# Patient Record
Sex: FEMALE | Race: WHITE | NOT HISPANIC OR LATINO | URBAN - METROPOLITAN AREA
[De-identification: names, ages, dates, MRNs, and addresses within clinical notes are randomized per-mention and may not be internally consistent; named-entity substitution may affect disease eponyms.]

---

## 2017-03-23 ENCOUNTER — EMERGENCY (EMERGENCY)
Facility: HOSPITAL | Age: 82
LOS: 0 days | Discharge: NURSING, REHAB OR LONG-TERM CARE | End: 2017-03-24
Attending: EMERGENCY MEDICINE | Admitting: EMERGENCY MEDICINE
Payer: COMMERCIAL

## 2017-03-23 VITALS
RESPIRATION RATE: 16 BRPM | TEMPERATURE: 97 F | DIASTOLIC BLOOD PRESSURE: 51 MMHG | HEIGHT: 61 IN | OXYGEN SATURATION: 94 % | SYSTOLIC BLOOD PRESSURE: 130 MMHG | HEART RATE: 54 BPM | WEIGHT: 130.07 LBS

## 2017-03-23 DIAGNOSIS — F03.90 UNSPECIFIED DEMENTIA, UNSPECIFIED SEVERITY, WITHOUT BEHAVIORAL DISTURBANCE, PSYCHOTIC DISTURBANCE, MOOD DISTURBANCE, AND ANXIETY: ICD-10-CM

## 2017-03-23 DIAGNOSIS — Z91.81 HISTORY OF FALLING: ICD-10-CM

## 2017-03-23 DIAGNOSIS — S09.90XA UNSPECIFIED INJURY OF HEAD, INITIAL ENCOUNTER: ICD-10-CM
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
## 2017-03-23 DIAGNOSIS — R55 SYNCOPE AND COLLAPSE: ICD-10-CM

## 2017-03-23 DIAGNOSIS — N39.0 URINARY TRACT INFECTION, SITE NOT SPECIFIED: ICD-10-CM
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
## 2017-03-23 PROCEDURE — 99285 EMERGENCY DEPT VISIT HI MDM: CPT | Mod: 25

## 2017-03-24 VITALS
OXYGEN SATURATION: 98 % | SYSTOLIC BLOOD PRESSURE: 137 MMHG | HEART RATE: 64 BPM | TEMPERATURE: 98 F | RESPIRATION RATE: 16 BRPM | DIASTOLIC BLOOD PRESSURE: 55 MMHG

## 2017-03-24 LAB
ALBUMIN SERPL ELPH-MCNC: 3.2 G/DL — LOW (ref 3.3–5)
ALP SERPL-CCNC: 78 U/L — SIGNIFICANT CHANGE UP (ref 40–120)
ALT FLD-CCNC: 14 U/L — SIGNIFICANT CHANGE UP (ref 12–78)
ANION GAP SERPL CALC-SCNC: 8 MMOL/L — SIGNIFICANT CHANGE UP (ref 5–17)
APPEARANCE UR: CLEAR — SIGNIFICANT CHANGE UP
APTT BLD: 29.7 SEC — SIGNIFICANT CHANGE UP (ref 27.5–37.4)
AST SERPL-CCNC: 24 U/L — SIGNIFICANT CHANGE UP (ref 15–37)
BACTERIA # UR AUTO: NEGATIVE — SIGNIFICANT CHANGE UP
BASOPHILS # BLD AUTO: 0.1 K/UL — SIGNIFICANT CHANGE UP (ref 0–0.2)
BASOPHILS NFR BLD AUTO: 1.2 % — SIGNIFICANT CHANGE UP (ref 0–2)
BILIRUB SERPL-MCNC: 0.3 MG/DL — SIGNIFICANT CHANGE UP (ref 0.2–1.2)
BILIRUB UR-MCNC: NEGATIVE — SIGNIFICANT CHANGE UP
BUN SERPL-MCNC: 19 MG/DL — SIGNIFICANT CHANGE UP (ref 7–23)
CALCIUM SERPL-MCNC: 8.9 MG/DL — SIGNIFICANT CHANGE UP (ref 8.5–10.1)
CHLORIDE SERPL-SCNC: 104 MMOL/L — SIGNIFICANT CHANGE UP (ref 96–108)
CK SERPL-CCNC: 96 U/L — SIGNIFICANT CHANGE UP (ref 26–192)
CO2 SERPL-SCNC: 30 MMOL/L — SIGNIFICANT CHANGE UP (ref 22–31)
COLOR SPEC: YELLOW — SIGNIFICANT CHANGE UP
CREAT SERPL-MCNC: 0.61 MG/DL — SIGNIFICANT CHANGE UP (ref 0.5–1.3)
DIFF PNL FLD: (no result)
EOSINOPHIL # BLD AUTO: 0 K/UL — SIGNIFICANT CHANGE UP (ref 0–0.5)
EOSINOPHIL NFR BLD AUTO: 0 % — SIGNIFICANT CHANGE UP (ref 0–6)
EPI CELLS # UR: NEGATIVE — SIGNIFICANT CHANGE UP
GLUCOSE SERPL-MCNC: 98 MG/DL — SIGNIFICANT CHANGE UP (ref 70–99)
GLUCOSE UR QL: NEGATIVE MG/DL — SIGNIFICANT CHANGE UP
HCT VFR BLD CALC: 37.4 % — SIGNIFICANT CHANGE UP (ref 34.5–45)
HGB BLD-MCNC: 12 G/DL — SIGNIFICANT CHANGE UP (ref 11.5–15.5)
INR BLD: 0.87 RATIO — LOW (ref 0.88–1.16)
KETONES UR-MCNC: (no result)
LACTATE SERPL-SCNC: 0.9 MMOL/L — SIGNIFICANT CHANGE UP (ref 0.7–2)
LEUKOCYTE ESTERASE UR-ACNC: (no result)
LYMPHOCYTES # BLD AUTO: 1.6 K/UL — SIGNIFICANT CHANGE UP (ref 1–3.3)
LYMPHOCYTES # BLD AUTO: 20.5 % — SIGNIFICANT CHANGE UP (ref 13–44)
MCHC RBC-ENTMCNC: 29.5 PG — SIGNIFICANT CHANGE UP (ref 27–34)
MCHC RBC-ENTMCNC: 32 GM/DL — SIGNIFICANT CHANGE UP (ref 32–36)
MCV RBC AUTO: 92.3 FL — SIGNIFICANT CHANGE UP (ref 80–100)
MONOCYTES # BLD AUTO: 0.6 K/UL — SIGNIFICANT CHANGE UP (ref 0–0.9)
MONOCYTES NFR BLD AUTO: 7.4 % — SIGNIFICANT CHANGE UP (ref 2–14)
NEUTROPHILS # BLD AUTO: 5.4 K/UL — SIGNIFICANT CHANGE UP (ref 1.8–7.4)
NEUTROPHILS NFR BLD AUTO: 70.9 % — SIGNIFICANT CHANGE UP (ref 43–77)
NITRITE UR-MCNC: NEGATIVE — SIGNIFICANT CHANGE UP
PH UR: 6 — SIGNIFICANT CHANGE UP (ref 4.8–8)
PLATELET # BLD AUTO: 237 K/UL — SIGNIFICANT CHANGE UP (ref 150–400)
POTASSIUM SERPL-MCNC: 4.6 MMOL/L — SIGNIFICANT CHANGE UP (ref 3.5–5.3)
POTASSIUM SERPL-SCNC: 4.6 MMOL/L — SIGNIFICANT CHANGE UP (ref 3.5–5.3)
PROT SERPL-MCNC: 6.4 GM/DL — SIGNIFICANT CHANGE UP (ref 6–8.3)
PROT UR-MCNC: NEGATIVE MG/DL — SIGNIFICANT CHANGE UP
PROTHROM AB SERPL-ACNC: 9.4 SEC — LOW (ref 9.8–12.7)
RBC # BLD: 4.05 M/UL — SIGNIFICANT CHANGE UP (ref 3.8–5.2)
RBC # FLD: 14 % — SIGNIFICANT CHANGE UP (ref 10.3–14.5)
RBC CASTS # UR COMP ASSIST: SIGNIFICANT CHANGE UP /HPF (ref 0–4)
SODIUM SERPL-SCNC: 142 MMOL/L — SIGNIFICANT CHANGE UP (ref 135–145)
SP GR SPEC: 1.01 — SIGNIFICANT CHANGE UP (ref 1.01–1.02)
TROPONIN I SERPL-MCNC: 0.08 NG/ML — HIGH (ref 0.01–0.04)
TROPONIN I SERPL-MCNC: 0.08 NG/ML — HIGH (ref 0.01–0.04)
UROBILINOGEN FLD QL: NEGATIVE MG/DL — SIGNIFICANT CHANGE UP
WBC # BLD: 7.7 K/UL — SIGNIFICANT CHANGE UP (ref 3.8–10.5)
WBC # FLD AUTO: 7.7 K/UL — SIGNIFICANT CHANGE UP (ref 3.8–10.5)
WBC UR QL: SIGNIFICANT CHANGE UP

## 2017-03-24 PROCEDURE — 72125 CT NECK SPINE W/O DYE: CPT | Mod: 26

## 2017-03-24 PROCEDURE — 70450 CT HEAD/BRAIN W/O DYE: CPT | Mod: 26

## 2017-03-24 PROCEDURE — 93010 ELECTROCARDIOGRAM REPORT: CPT

## 2017-03-24 PROCEDURE — 72190 X-RAY EXAM OF PELVIS: CPT | Mod: 26

## 2017-03-24 PROCEDURE — 71010: CPT | Mod: 26

## 2017-03-24 RX ORDER — SODIUM CHLORIDE 9 MG/ML
500 INJECTION INTRAMUSCULAR; INTRAVENOUS; SUBCUTANEOUS ONCE
Qty: 0 | Refills: 0 | Status: COMPLETED | OUTPATIENT
Start: 2017-03-24 | End: 2017-03-24

## 2017-03-24 RX ORDER — CEPHALEXIN 500 MG
1 CAPSULE ORAL
Qty: 21 | Refills: 0 | OUTPATIENT
Start: 2017-03-24

## 2017-03-24 RX ADMIN — SODIUM CHLORIDE 500 MILLILITER(S): 9 INJECTION INTRAMUSCULAR; INTRAVENOUS; SUBCUTANEOUS at 00:35

## 2017-03-24 NOTE — ED PROVIDER NOTE - ENMT, MLM
Airway patent, Nasal mucosa clear. Mouth with normal mucosa. Throat has no vesicles, no oropharyngeal exudates and uvula is midline. HEAD NCAT

## 2017-03-24 NOTE — ED PROVIDER NOTE - CARE PLAN
Principal Discharge DX:	Fall, initial encounter  Secondary Diagnosis:	Syncope, unspecified syncope type Principal Discharge DX:	Fall, initial encounter  Secondary Diagnosis:	Syncope, unspecified syncope type  Secondary Diagnosis:	Urinary tract infection with hematuria, site unspecified

## 2017-03-24 NOTE — ED PROVIDER NOTE - PROGRESS NOTE DETAILS
pt stable all night, no symptoms, essentially no change in trops throughout the night and stable for DC back to nursing home. pt stable all night, no symptoms, essentially no change in trops throughout the night and stable for DC back to nursing home with treatment for UTI and advise return for change.  Cts and xrays neg, ekg and labs neg as well and stable for DC home

## 2017-03-24 NOTE — ED ADULT NURSE REASSESSMENT NOTE - NS ED NURSE REASSESS COMMENT FT1
pt is resting comfortable ,  VSS. awaiting transportation to [facility]  on [address]. Will continue to monitor,.

## 2017-10-30 ENCOUNTER — EMERGENCY (EMERGENCY)
Facility: HOSPITAL | Age: 82
LOS: 0 days | Discharge: HOME | End: 2017-10-30
Attending: EMERGENCY MEDICINE | Admitting: EMERGENCY MEDICINE
Payer: COMMERCIAL

## 2017-10-30 VITALS
RESPIRATION RATE: 18 BRPM | HEART RATE: 56 BPM | TEMPERATURE: 98 F | DIASTOLIC BLOOD PRESSURE: 69 MMHG | SYSTOLIC BLOOD PRESSURE: 153 MMHG | OXYGEN SATURATION: 97 %

## 2017-10-30 VITALS
OXYGEN SATURATION: 96 % | TEMPERATURE: 98 F | HEART RATE: 53 BPM | DIASTOLIC BLOOD PRESSURE: 92 MMHG | RESPIRATION RATE: 16 BRPM | WEIGHT: 87.08 LBS | SYSTOLIC BLOOD PRESSURE: 132 MMHG | HEIGHT: 60 IN

## 2017-10-30 DIAGNOSIS — I10 ESSENTIAL (PRIMARY) HYPERTENSION: ICD-10-CM

## 2017-10-30 DIAGNOSIS — M50.30 OTHER CERVICAL DISC DEGENERATION, UNSPECIFIED CERVICAL REGION: ICD-10-CM

## 2017-10-30 DIAGNOSIS — S09.90XA UNSPECIFIED INJURY OF HEAD, INITIAL ENCOUNTER: ICD-10-CM

## 2017-10-30 DIAGNOSIS — F41.9 ANXIETY DISORDER, UNSPECIFIED: ICD-10-CM

## 2017-10-30 DIAGNOSIS — K21.9 GASTRO-ESOPHAGEAL REFLUX DISEASE WITHOUT ESOPHAGITIS: ICD-10-CM

## 2017-10-30 DIAGNOSIS — I67.89 OTHER CEREBROVASCULAR DISEASE: ICD-10-CM

## 2017-10-30 DIAGNOSIS — F03.90 UNSPECIFIED DEMENTIA WITHOUT BEHAVIORAL DISTURBANCE: ICD-10-CM

## 2017-10-30 DIAGNOSIS — Z79.82 LONG TERM (CURRENT) USE OF ASPIRIN: ICD-10-CM

## 2017-10-30 DIAGNOSIS — M47.9 SPONDYLOSIS, UNSPECIFIED: ICD-10-CM

## 2017-10-30 DIAGNOSIS — E78.5 HYPERLIPIDEMIA, UNSPECIFIED: ICD-10-CM
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
## 2017-10-30 LAB
ALBUMIN SERPL ELPH-MCNC: 3.3 G/DL — SIGNIFICANT CHANGE UP (ref 3.3–5)
ALP SERPL-CCNC: 119 U/L — SIGNIFICANT CHANGE UP (ref 40–120)
ALT FLD-CCNC: 16 U/L — SIGNIFICANT CHANGE UP (ref 12–78)
ANION GAP SERPL CALC-SCNC: 8 MMOL/L — SIGNIFICANT CHANGE UP (ref 5–17)
AST SERPL-CCNC: 21 U/L — SIGNIFICANT CHANGE UP (ref 15–37)
BASOPHILS # BLD AUTO: 0.1 K/UL — SIGNIFICANT CHANGE UP (ref 0–0.2)
BASOPHILS NFR BLD AUTO: 0.9 % — SIGNIFICANT CHANGE UP (ref 0–2)
BILIRUB SERPL-MCNC: 0.5 MG/DL — SIGNIFICANT CHANGE UP (ref 0.2–1.2)
BUN SERPL-MCNC: 18 MG/DL — SIGNIFICANT CHANGE UP (ref 7–23)
CALCIUM SERPL-MCNC: 8.7 MG/DL — SIGNIFICANT CHANGE UP (ref 8.5–10.1)
CHLORIDE SERPL-SCNC: 104 MMOL/L — SIGNIFICANT CHANGE UP (ref 96–108)
CO2 SERPL-SCNC: 30 MMOL/L — SIGNIFICANT CHANGE UP (ref 22–31)
CREAT SERPL-MCNC: 0.59 MG/DL — SIGNIFICANT CHANGE UP (ref 0.5–1.3)
EOSINOPHIL # BLD AUTO: 0 K/UL — SIGNIFICANT CHANGE UP (ref 0–0.5)
EOSINOPHIL NFR BLD AUTO: 0.1 % — SIGNIFICANT CHANGE UP (ref 0–6)
GLUCOSE SERPL-MCNC: 74 MG/DL — SIGNIFICANT CHANGE UP (ref 70–99)
HCT VFR BLD CALC: 42.2 % — SIGNIFICANT CHANGE UP (ref 34.5–45)
HGB BLD-MCNC: 13.8 G/DL — SIGNIFICANT CHANGE UP (ref 11.5–15.5)
LYMPHOCYTES # BLD AUTO: 2.2 K/UL — SIGNIFICANT CHANGE UP (ref 1–3.3)
LYMPHOCYTES # BLD AUTO: 31.1 % — SIGNIFICANT CHANGE UP (ref 13–44)
MCHC RBC-ENTMCNC: 29.7 PG — SIGNIFICANT CHANGE UP (ref 27–34)
MCHC RBC-ENTMCNC: 32.6 GM/DL — SIGNIFICANT CHANGE UP (ref 32–36)
MCV RBC AUTO: 91.1 FL — SIGNIFICANT CHANGE UP (ref 80–100)
MONOCYTES # BLD AUTO: 0.7 K/UL — SIGNIFICANT CHANGE UP (ref 0–0.9)
MONOCYTES NFR BLD AUTO: 9.9 % — SIGNIFICANT CHANGE UP (ref 2–14)
NEUTROPHILS # BLD AUTO: 4 K/UL — SIGNIFICANT CHANGE UP (ref 1.8–7.4)
NEUTROPHILS NFR BLD AUTO: 58 % — SIGNIFICANT CHANGE UP (ref 43–77)
PLATELET # BLD AUTO: 293 K/UL — SIGNIFICANT CHANGE UP (ref 150–400)
POTASSIUM SERPL-MCNC: 3.8 MMOL/L — SIGNIFICANT CHANGE UP (ref 3.5–5.3)
POTASSIUM SERPL-SCNC: 3.8 MMOL/L — SIGNIFICANT CHANGE UP (ref 3.5–5.3)
PROT SERPL-MCNC: 6.7 GM/DL — SIGNIFICANT CHANGE UP (ref 6–8.3)
RBC # BLD: 4.63 M/UL — SIGNIFICANT CHANGE UP (ref 3.8–5.2)
RBC # FLD: 12.6 % — SIGNIFICANT CHANGE UP (ref 10.3–14.5)
SODIUM SERPL-SCNC: 142 MMOL/L — SIGNIFICANT CHANGE UP (ref 135–145)
WBC # BLD: 6.9 K/UL — SIGNIFICANT CHANGE UP (ref 3.8–10.5)
WBC # FLD AUTO: 6.9 K/UL — SIGNIFICANT CHANGE UP (ref 3.8–10.5)

## 2017-10-30 PROCEDURE — 72125 CT NECK SPINE W/O DYE: CPT | Mod: 26

## 2017-10-30 PROCEDURE — 70450 CT HEAD/BRAIN W/O DYE: CPT | Mod: 26

## 2017-10-30 PROCEDURE — 99285 EMERGENCY DEPT VISIT HI MDM: CPT

## 2017-10-30 RX ORDER — LATANOPROST 0.05 MG/ML
1 SOLUTION/ DROPS OPHTHALMIC; TOPICAL
Qty: 0 | Refills: 0 | COMMUNITY

## 2017-10-30 NOTE — ED PROVIDER NOTE - OBJECTIVE STATEMENT
89F pmhx dementia, HLD, HTN, GERD, anxiety presents to the ED s/p fall. Pt states she doesn't remember anything, only that she fell. No acute complaint at this time. Pt is a poor historian and HPI/ROS is limited.

## 2017-10-30 NOTE — ED PROVIDER NOTE - MUSCULOSKELETAL, MLM
Spine appears normal, range of motion is not limited, no muscle or joint tenderness, no posterior c-spine ttp, no hip instability or pain, no chest wall ttp

## 2017-10-30 NOTE — ED ADULT NURSE NOTE - OBJECTIVE STATEMENT
Pt brought in by ambulance for unwitnessed fall at [facility]isted [facility]. Pt has abrasion with ecchymotic area on forehead. Pt brought in by ambulance for unwitnessed fall at [facility]isted [facility]. Pt has abrasion with ecchymotic area on forehead. Pt is on ASA in facility. Pt brought in by ambulance for unwitnessed fall at [facility]isted [facility]. Pt has abrasion with ecchymotic area on forehead. Pt is on ASA in facility. Pt talking clearly although very forgetful. No other visible injury noted. Pt is a trauma alert

## 2017-11-16 ENCOUNTER — EMERGENCY (EMERGENCY)
Facility: HOSPITAL | Age: 82
LOS: 0 days | Discharge: HOME | End: 2017-11-16
Attending: EMERGENCY MEDICINE | Admitting: EMERGENCY MEDICINE
Payer: COMMERCIAL

## 2017-11-16 VITALS
HEART RATE: 82 BPM | DIASTOLIC BLOOD PRESSURE: 92 MMHG | OXYGEN SATURATION: 94 % | RESPIRATION RATE: 18 BRPM | TEMPERATURE: 98 F | SYSTOLIC BLOOD PRESSURE: 129 MMHG

## 2017-11-16 VITALS — WEIGHT: 100.09 LBS | HEIGHT: 60 IN

## 2017-11-16 LAB
ALBUMIN SERPL ELPH-MCNC: 2.7 G/DL — LOW (ref 3.3–5)
ALP SERPL-CCNC: 100 U/L — SIGNIFICANT CHANGE UP (ref 40–120)
ALT FLD-CCNC: 17 U/L — SIGNIFICANT CHANGE UP (ref 12–78)
ANION GAP SERPL CALC-SCNC: 6 MMOL/L — SIGNIFICANT CHANGE UP (ref 5–17)
APTT BLD: 28.8 SEC — SIGNIFICANT CHANGE UP (ref 27.5–37.4)
AST SERPL-CCNC: 25 U/L — SIGNIFICANT CHANGE UP (ref 15–37)
BASOPHILS # BLD AUTO: 0.1 K/UL — SIGNIFICANT CHANGE UP (ref 0–0.2)
BASOPHILS NFR BLD AUTO: 0.9 % — SIGNIFICANT CHANGE UP (ref 0–2)
BILIRUB SERPL-MCNC: 0.5 MG/DL — SIGNIFICANT CHANGE UP (ref 0.2–1.2)
BUN SERPL-MCNC: 16 MG/DL — SIGNIFICANT CHANGE UP (ref 7–23)
CALCIUM SERPL-MCNC: 8.3 MG/DL — LOW (ref 8.5–10.1)
CHLORIDE SERPL-SCNC: 106 MMOL/L — SIGNIFICANT CHANGE UP (ref 96–108)
CO2 SERPL-SCNC: 29 MMOL/L — SIGNIFICANT CHANGE UP (ref 22–31)
CREAT SERPL-MCNC: 0.63 MG/DL — SIGNIFICANT CHANGE UP (ref 0.5–1.3)
EOSINOPHIL # BLD AUTO: 0 K/UL — SIGNIFICANT CHANGE UP (ref 0–0.5)
EOSINOPHIL NFR BLD AUTO: 0 % — SIGNIFICANT CHANGE UP (ref 0–6)
GLUCOSE SERPL-MCNC: 99 MG/DL — SIGNIFICANT CHANGE UP (ref 70–99)
HCT VFR BLD CALC: 39.4 % — SIGNIFICANT CHANGE UP (ref 34.5–45)
HGB BLD-MCNC: 12.4 G/DL — SIGNIFICANT CHANGE UP (ref 11.5–15.5)
INR BLD: 0.87 RATIO — LOW (ref 0.88–1.16)
LYMPHOCYTES # BLD AUTO: 1.5 K/UL — SIGNIFICANT CHANGE UP (ref 1–3.3)
LYMPHOCYTES # BLD AUTO: 13.7 % — SIGNIFICANT CHANGE UP (ref 13–44)
MCHC RBC-ENTMCNC: 29.4 PG — SIGNIFICANT CHANGE UP (ref 27–34)
MCHC RBC-ENTMCNC: 31.6 GM/DL — LOW (ref 32–36)
MCV RBC AUTO: 93.1 FL — SIGNIFICANT CHANGE UP (ref 80–100)
MONOCYTES # BLD AUTO: 0.9 K/UL — SIGNIFICANT CHANGE UP (ref 0–0.9)
MONOCYTES NFR BLD AUTO: 8.9 % — SIGNIFICANT CHANGE UP (ref 2–14)
NEUTROPHILS # BLD AUTO: 8.1 K/UL — HIGH (ref 1.8–7.4)
NEUTROPHILS NFR BLD AUTO: 76.4 % — SIGNIFICANT CHANGE UP (ref 43–77)
PLATELET # BLD AUTO: 227 K/UL — SIGNIFICANT CHANGE UP (ref 150–400)
POTASSIUM SERPL-MCNC: 4.4 MMOL/L — SIGNIFICANT CHANGE UP (ref 3.5–5.3)
POTASSIUM SERPL-SCNC: 4.4 MMOL/L — SIGNIFICANT CHANGE UP (ref 3.5–5.3)
PROT SERPL-MCNC: 5.7 GM/DL — LOW (ref 6–8.3)
PROTHROM AB SERPL-ACNC: 9.4 SEC — LOW (ref 9.8–12.7)
RBC # BLD: 4.23 M/UL — SIGNIFICANT CHANGE UP (ref 3.8–5.2)
RBC # FLD: 13.2 % — SIGNIFICANT CHANGE UP (ref 10.3–14.5)
SODIUM SERPL-SCNC: 141 MMOL/L — SIGNIFICANT CHANGE UP (ref 135–145)
WBC # BLD: 10.6 K/UL — HIGH (ref 3.8–10.5)
WBC # FLD AUTO: 10.6 K/UL — HIGH (ref 3.8–10.5)

## 2017-11-16 PROCEDURE — 99284 EMERGENCY DEPT VISIT MOD MDM: CPT | Mod: 25

## 2017-11-16 RX ORDER — MIDAZOLAM HYDROCHLORIDE 1 MG/ML
6 INJECTION, SOLUTION INTRAMUSCULAR; INTRAVENOUS ONCE
Qty: 0 | Refills: 0 | Status: DISCONTINUED | OUTPATIENT
Start: 2017-11-16 | End: 2017-11-16

## 2017-11-16 RX ADMIN — MIDAZOLAM HYDROCHLORIDE 6 MILLIGRAM(S): 1 INJECTION, SOLUTION INTRAMUSCULAR; INTRAVENOUS at 09:09

## 2017-11-16 RX ADMIN — MIDAZOLAM HYDROCHLORIDE 6 MILLIGRAM(S): 1 INJECTION, SOLUTION INTRAMUSCULAR; INTRAVENOUS at 11:10

## 2017-11-16 NOTE — ED ADULT NURSE REASSESSMENT NOTE - NS ED NURSE REASSESS COMMENT FT1
Colorectal to bedside and pt not tolerating reduction well. MD ordered versed intranasal with call out to colorectal to notify them that pt is prepared for medication.

## 2017-11-16 NOTE — ED ADULT TRIAGE NOTE - CHIEF COMPLAINT QUOTE
As per ems rectal prolapse, blood noted to rectum with prolapse upon ems arrival. Hx GERD and dementia. Complaining of rectal pain.

## 2017-11-16 NOTE — CONSULT NOTE ADULT - SUBJECTIVE AND OBJECTIVE BOX
89 y/o female with ED visit for rectal prolapse. Pt is a poor historian, ED staff states she has been in Er before for similar and they have reduced prolapse successfully.   not sure how long this prolapse has been present    PAST MEDICAL & SURGICAL HISTORY:  Dementia  GERD (gastroesophageal reflux disease)  HLD (hyperlipidemia)  HTN (hypertension)  No significant past surgical history    MEDICATIONS  (STANDING):    MEDICATIONS  (PRN):    Allergies    No Known Allergies    Intolerances    Vital Signs Last 24 Hrs  T(C): 36.6 (16 Nov 2017 06:23), Max: 36.6 (16 Nov 2017 06:23)  T(F): 97.8 (16 Nov 2017 06:23), Max: 97.8 (16 Nov 2017 06:23)  HR: 88 (16 Nov 2017 13:15) (77 - 99)  BP: 133/83 (16 Nov 2017 13:15) (105/84 - 171/68)  BP(mean): --  RR: 18 (16 Nov 2017 13:15) (18 - 18)  SpO2: 95% (16 Nov 2017 13:15) (94% - 98%)    awake, mildly combative  abd soft  full thickness rectal prolapse ~6-7 cm   sugar has been placed on prolapse in attempt to reduce  viable rectum    Dr. Singh and myself were able to reduce the prolapse  with minimal bleeding                          12.4   10.6  )-----------( 227      ( 16 Nov 2017 12:22 )             39.4   11-16    141  |  106  |  16  ----------------------------<  99  4.4   |  29  |  0.63    Ca    8.3<L>      16 Nov 2017 12:22    TPro  5.7<L>  /  Alb  2.7<L>  /  TBili  0.5  /  DBili  x   /  AST  25  /  ALT  17  /  AlkPhos  100  11-16    PT/INR - ( 16 Nov 2017 12:22 )   PT: 9.4 sec;   INR: 0.87 ratio         PTT - ( 16 Nov 2017 12:22 )  PTT:28.8 sec

## 2017-11-16 NOTE — ED ADULT NURSE REASSESSMENT NOTE - NS ED NURSE REASSESS COMMENT FT1
colorectal to bedside again and unable to reduce. Pt to be to OR and IVL placed as ordered. Will cont to monitor.

## 2017-11-16 NOTE — ED PROVIDER NOTE - OBJECTIVE STATEMENT
89 yo female with h/o HTN, GERD, Dementia and known h/o rectal prolapse, sent to ED with rectal prolapse.  Pt offers no complaints.

## 2017-11-16 NOTE — ED ADULT NURSE NOTE - ED STAT RN HANDOFF DETAILS
Pt received from prior RN alert and confused with report that she is at her baseline status. Pt has rectal prolapse unable to be reduced bedside. Pt awaiting further consult at this time. Will cont to monitor.

## 2017-11-16 NOTE — CONSULT NOTE ADULT - ASSESSMENT
rectal prolapse recurrent    stable for dc   may f/u in office to discuss further management   jayshree otto  ED staff notified

## 2017-11-16 NOTE — ED ADULT NURSE NOTE - OBJECTIVE STATEMENT
Pt presents to the ED with complaints of rectal pain s/p prolapsed rectum. Pt has a h/o a prolapsed rectum. No bleeding noted to the area.

## 2017-11-17 DIAGNOSIS — E78.5 HYPERLIPIDEMIA, UNSPECIFIED: ICD-10-CM

## 2017-11-17 DIAGNOSIS — Z79.82 LONG TERM (CURRENT) USE OF ASPIRIN: ICD-10-CM

## 2017-11-17 DIAGNOSIS — K21.9 GASTRO-ESOPHAGEAL REFLUX DISEASE WITHOUT ESOPHAGITIS: ICD-10-CM

## 2017-11-17 DIAGNOSIS — I10 ESSENTIAL (PRIMARY) HYPERTENSION: ICD-10-CM

## 2017-11-17 DIAGNOSIS — K62.3 RECTAL PROLAPSE: ICD-10-CM

## 2017-11-17 DIAGNOSIS — F03.90 UNSPECIFIED DEMENTIA WITHOUT BEHAVIORAL DISTURBANCE: ICD-10-CM

## 2017-11-17 DIAGNOSIS — K62.89 OTHER SPECIFIED DISEASES OF ANUS AND RECTUM: ICD-10-CM

## 2017-12-29 ENCOUNTER — INPATIENT (INPATIENT)
Facility: HOSPITAL | Age: 82
LOS: 10 days | Discharge: HOSPICE | End: 2018-01-09
Attending: INTERNAL MEDICINE | Admitting: FAMILY MEDICINE
Payer: COMMERCIAL

## 2017-12-29 VITALS — DIASTOLIC BLOOD PRESSURE: 73 MMHG | HEART RATE: 84 BPM | SYSTOLIC BLOOD PRESSURE: 122 MMHG | RESPIRATION RATE: 16 BRPM

## 2017-12-29 LAB
ANION GAP SERPL CALC-SCNC: 10 MMOL/L — SIGNIFICANT CHANGE UP (ref 5–17)
APPEARANCE UR: CLEAR — SIGNIFICANT CHANGE UP
BACTERIA # UR AUTO: (no result)
BASOPHILS # BLD AUTO: 0.1 K/UL — SIGNIFICANT CHANGE UP (ref 0–0.2)
BASOPHILS NFR BLD AUTO: 0.3 % — SIGNIFICANT CHANGE UP (ref 0–2)
BILIRUB UR-MCNC: NEGATIVE — SIGNIFICANT CHANGE UP
BUN SERPL-MCNC: 34 MG/DL — HIGH (ref 7–23)
BURR CELLS BLD QL SMEAR: SIGNIFICANT CHANGE UP
CALCIUM SERPL-MCNC: 8.5 MG/DL — SIGNIFICANT CHANGE UP (ref 8.5–10.1)
CHLORIDE SERPL-SCNC: 105 MMOL/L — SIGNIFICANT CHANGE UP (ref 96–108)
CO2 SERPL-SCNC: 25 MMOL/L — SIGNIFICANT CHANGE UP (ref 22–31)
COLOR SPEC: YELLOW — SIGNIFICANT CHANGE UP
COMMENT - URINE: SIGNIFICANT CHANGE UP
CREAT SERPL-MCNC: 1.18 MG/DL — SIGNIFICANT CHANGE UP (ref 0.5–1.3)
DIFF PNL FLD: NEGATIVE — SIGNIFICANT CHANGE UP
ELLIPTOCYTES BLD QL SMEAR: SLIGHT — SIGNIFICANT CHANGE UP
EOSINOPHIL # BLD AUTO: 0 K/UL — SIGNIFICANT CHANGE UP (ref 0–0.5)
EOSINOPHIL NFR BLD AUTO: 0 % — SIGNIFICANT CHANGE UP (ref 0–6)
EPI CELLS # UR: SIGNIFICANT CHANGE UP
GLUCOSE SERPL-MCNC: 157 MG/DL — HIGH (ref 70–99)
GLUCOSE UR QL: NEGATIVE MG/DL — SIGNIFICANT CHANGE UP
GRAN CASTS # UR COMP ASSIST: (no result) /LPF
HCT VFR BLD CALC: 29.8 % — LOW (ref 34.5–45)
HGB BLD-MCNC: 9.6 G/DL — LOW (ref 11.5–15.5)
KETONES UR-MCNC: NEGATIVE — SIGNIFICANT CHANGE UP
LEUKOCYTE ESTERASE UR-ACNC: NEGATIVE — SIGNIFICANT CHANGE UP
LYMPHOCYTES # BLD AUTO: 0.8 K/UL — LOW (ref 1–3.3)
LYMPHOCYTES # BLD AUTO: 4.4 % — LOW (ref 13–44)
MANUAL DIF COMMENT BLD-IMP: SIGNIFICANT CHANGE UP
MCHC RBC-ENTMCNC: 30.5 PG — SIGNIFICANT CHANGE UP (ref 27–34)
MCHC RBC-ENTMCNC: 32.4 GM/DL — SIGNIFICANT CHANGE UP (ref 32–36)
MCV RBC AUTO: 94.2 FL — SIGNIFICANT CHANGE UP (ref 80–100)
MONOCYTES # BLD AUTO: 1.2 K/UL — HIGH (ref 0–0.9)
MONOCYTES NFR BLD AUTO: 7.1 % — SIGNIFICANT CHANGE UP (ref 2–14)
NEUTROPHILS # BLD AUTO: 15.2 K/UL — HIGH (ref 1.8–7.4)
NEUTROPHILS NFR BLD AUTO: 88.1 % — HIGH (ref 43–77)
NEUTS BAND # BLD: 4 % — SIGNIFICANT CHANGE UP (ref 0–8)
NITRITE UR-MCNC: NEGATIVE — SIGNIFICANT CHANGE UP
PH UR: 6 — SIGNIFICANT CHANGE UP (ref 5–8)
PLAT MORPH BLD: NORMAL — SIGNIFICANT CHANGE UP
PLATELET # BLD AUTO: 288 K/UL — SIGNIFICANT CHANGE UP (ref 150–400)
POIKILOCYTOSIS BLD QL AUTO: SLIGHT — SIGNIFICANT CHANGE UP
POTASSIUM SERPL-MCNC: 4.9 MMOL/L — SIGNIFICANT CHANGE UP (ref 3.5–5.3)
POTASSIUM SERPL-SCNC: 4.9 MMOL/L — SIGNIFICANT CHANGE UP (ref 3.5–5.3)
PROT UR-MCNC: 15 MG/DL
RBC # BLD: 3.16 M/UL — LOW (ref 3.8–5.2)
RBC # FLD: 13.6 % — SIGNIFICANT CHANGE UP (ref 10.3–14.5)
RBC BLD AUTO: (no result)
RBC CASTS # UR COMP ASSIST: SIGNIFICANT CHANGE UP /HPF (ref 0–4)
SCHISTOCYTES BLD QL AUTO: SIGNIFICANT CHANGE UP
SODIUM SERPL-SCNC: 140 MMOL/L — SIGNIFICANT CHANGE UP (ref 135–145)
SP GR SPEC: 1.01 — SIGNIFICANT CHANGE UP (ref 1.01–1.02)
TOXIC GRANULES BLD QL SMEAR: SLIGHT — SIGNIFICANT CHANGE UP
UROBILINOGEN FLD QL: 1 MG/DL
WBC # BLD: 17.3 K/UL — HIGH (ref 3.8–10.5)
WBC # FLD AUTO: 17.3 K/UL — HIGH (ref 3.8–10.5)
WBC UR QL: SIGNIFICANT CHANGE UP

## 2017-12-29 PROCEDURE — 71010: CPT | Mod: 26

## 2017-12-29 PROCEDURE — 99285 EMERGENCY DEPT VISIT HI MDM: CPT

## 2017-12-29 RX ORDER — CEFEPIME 1 G/1
1000 INJECTION, POWDER, FOR SOLUTION INTRAMUSCULAR; INTRAVENOUS ONCE
Qty: 0 | Refills: 0 | Status: COMPLETED | OUTPATIENT
Start: 2017-12-29 | End: 2017-12-29

## 2017-12-29 RX ORDER — VANCOMYCIN HCL 1 G
1000 VIAL (EA) INTRAVENOUS ONCE
Qty: 0 | Refills: 0 | Status: COMPLETED | OUTPATIENT
Start: 2017-12-29 | End: 2017-12-30

## 2017-12-29 RX ORDER — SODIUM CHLORIDE 9 MG/ML
2000 INJECTION INTRAMUSCULAR; INTRAVENOUS; SUBCUTANEOUS ONCE
Qty: 0 | Refills: 0 | Status: COMPLETED | OUTPATIENT
Start: 2017-12-29 | End: 2017-12-29

## 2017-12-29 RX ADMIN — SODIUM CHLORIDE 2000 MILLILITER(S): 9 INJECTION INTRAMUSCULAR; INTRAVENOUS; SUBCUTANEOUS at 18:57

## 2017-12-29 NOTE — ED PROVIDER NOTE - OBJECTIVE STATEMENT
Pt comes ot the ED sent by [facility] for r/o dehydration. Pt has hx of dementia, HTN, HPL. Pt denies fevers. In Ed patient is thin and frail appearing complaining of no pain. Tachy to 110.

## 2017-12-30 DIAGNOSIS — N28.1 CYST OF KIDNEY, ACQUIRED: ICD-10-CM

## 2017-12-30 LAB
ADD ON TEST-SPECIMEN IN LAB: SIGNIFICANT CHANGE UP
ALBUMIN SERPL ELPH-MCNC: 3.1 G/DL — LOW (ref 3.3–5)
ALBUMIN SERPL ELPH-MCNC: 3.4 G/DL — SIGNIFICANT CHANGE UP (ref 3.3–5)
ALP SERPL-CCNC: 81 U/L — SIGNIFICANT CHANGE UP (ref 40–120)
ALP SERPL-CCNC: 88 U/L — SIGNIFICANT CHANGE UP (ref 40–120)
ALT FLD-CCNC: 19 U/L — SIGNIFICANT CHANGE UP (ref 12–78)
ALT FLD-CCNC: 22 U/L — SIGNIFICANT CHANGE UP (ref 12–78)
ANION GAP SERPL CALC-SCNC: 6 MMOL/L — SIGNIFICANT CHANGE UP (ref 5–17)
AST SERPL-CCNC: 34 U/L — SIGNIFICANT CHANGE UP (ref 15–37)
AST SERPL-CCNC: 37 U/L — SIGNIFICANT CHANGE UP (ref 15–37)
BASOPHILS # BLD AUTO: 0.1 K/UL — SIGNIFICANT CHANGE UP (ref 0–0.2)
BASOPHILS NFR BLD AUTO: 0.4 % — SIGNIFICANT CHANGE UP (ref 0–2)
BILIRUB DIRECT SERPL-MCNC: <0.1 MG/DL — SIGNIFICANT CHANGE UP (ref 0–0.2)
BILIRUB INDIRECT FLD-MCNC: >0.3 MG/DL — SIGNIFICANT CHANGE UP (ref 0.2–1)
BILIRUB SERPL-MCNC: 0.4 MG/DL — SIGNIFICANT CHANGE UP (ref 0.2–1.2)
BILIRUB SERPL-MCNC: 0.4 MG/DL — SIGNIFICANT CHANGE UP (ref 0.2–1.2)
BUN SERPL-MCNC: 38 MG/DL — HIGH (ref 7–23)
CALCIUM SERPL-MCNC: 8.3 MG/DL — LOW (ref 8.5–10.1)
CHLORIDE SERPL-SCNC: 106 MMOL/L — SIGNIFICANT CHANGE UP (ref 96–108)
CO2 SERPL-SCNC: 28 MMOL/L — SIGNIFICANT CHANGE UP (ref 22–31)
CREAT SERPL-MCNC: 1.18 MG/DL — SIGNIFICANT CHANGE UP (ref 0.5–1.3)
EOSINOPHIL # BLD AUTO: 0 K/UL — SIGNIFICANT CHANGE UP (ref 0–0.5)
EOSINOPHIL NFR BLD AUTO: 0 % — SIGNIFICANT CHANGE UP (ref 0–6)
GLUCOSE SERPL-MCNC: 138 MG/DL — HIGH (ref 70–99)
HCT VFR BLD CALC: 27.8 % — LOW (ref 34.5–45)
HGB BLD-MCNC: 8.8 G/DL — LOW (ref 11.5–15.5)
LACTATE SERPL-SCNC: 1.8 MMOL/L — SIGNIFICANT CHANGE UP (ref 0.7–2)
LACTATE SERPL-SCNC: 2.8 MMOL/L — HIGH (ref 0.7–2)
LACTATE SERPL-SCNC: 2.9 MMOL/L — HIGH (ref 0.7–2)
LYMPHOCYTES # BLD AUTO: 0.8 K/UL — LOW (ref 1–3.3)
LYMPHOCYTES # BLD AUTO: 6.5 % — LOW (ref 13–44)
MAGNESIUM SERPL-MCNC: 2.2 MG/DL — SIGNIFICANT CHANGE UP (ref 1.6–2.6)
MCHC RBC-ENTMCNC: 29.6 PG — SIGNIFICANT CHANGE UP (ref 27–34)
MCHC RBC-ENTMCNC: 31.8 GM/DL — LOW (ref 32–36)
MCV RBC AUTO: 93 FL — SIGNIFICANT CHANGE UP (ref 80–100)
MONOCYTES # BLD AUTO: 1.2 K/UL — HIGH (ref 0–0.9)
MONOCYTES NFR BLD AUTO: 9.5 % — SIGNIFICANT CHANGE UP (ref 2–14)
NEUTROPHILS # BLD AUTO: 10.7 K/UL — HIGH (ref 1.8–7.4)
NEUTROPHILS NFR BLD AUTO: 83.7 % — HIGH (ref 43–77)
PHOSPHATE SERPL-MCNC: 4.9 MG/DL — HIGH (ref 2.5–4.5)
PLATELET # BLD AUTO: 240 K/UL — SIGNIFICANT CHANGE UP (ref 150–400)
POTASSIUM SERPL-MCNC: 4.9 MMOL/L — SIGNIFICANT CHANGE UP (ref 3.5–5.3)
POTASSIUM SERPL-SCNC: 4.9 MMOL/L — SIGNIFICANT CHANGE UP (ref 3.5–5.3)
PROT SERPL-MCNC: 6.2 GM/DL — SIGNIFICANT CHANGE UP (ref 6–8.3)
PROT SERPL-MCNC: 6.4 GM/DL — SIGNIFICANT CHANGE UP (ref 6–8.3)
RAPID RVP RESULT: SIGNIFICANT CHANGE UP
RBC # BLD: 2.99 M/UL — LOW (ref 3.8–5.2)
RBC # FLD: 13.3 % — SIGNIFICANT CHANGE UP (ref 10.3–14.5)
SODIUM SERPL-SCNC: 140 MMOL/L — SIGNIFICANT CHANGE UP (ref 135–145)
WBC # BLD: 12.8 K/UL — HIGH (ref 3.8–10.5)
WBC # FLD AUTO: 12.8 K/UL — HIGH (ref 3.8–10.5)

## 2017-12-30 PROCEDURE — 71250 CT THORAX DX C-: CPT | Mod: 26

## 2017-12-30 PROCEDURE — 93970 EXTREMITY STUDY: CPT | Mod: 26

## 2017-12-30 RX ORDER — HEPARIN SODIUM 5000 [USP'U]/ML
5000 INJECTION INTRAVENOUS; SUBCUTANEOUS EVERY 12 HOURS
Qty: 0 | Refills: 0 | Status: DISCONTINUED | OUTPATIENT
Start: 2017-12-30 | End: 2017-12-30

## 2017-12-30 RX ORDER — PIPERACILLIN AND TAZOBACTAM 4; .5 G/20ML; G/20ML
3.38 INJECTION, POWDER, LYOPHILIZED, FOR SOLUTION INTRAVENOUS EVERY 12 HOURS
Qty: 0 | Refills: 0 | Status: DISCONTINUED | OUTPATIENT
Start: 2017-12-30 | End: 2018-01-01

## 2017-12-30 RX ORDER — QUETIAPINE FUMARATE 200 MG/1
12.5 TABLET, FILM COATED ORAL
Qty: 0 | Refills: 0 | COMMUNITY

## 2017-12-30 RX ORDER — IPRATROPIUM/ALBUTEROL SULFATE 18-103MCG
3 AEROSOL WITH ADAPTER (GRAM) INHALATION EVERY 4 HOURS
Qty: 0 | Refills: 0 | Status: DISCONTINUED | OUTPATIENT
Start: 2017-12-30 | End: 2018-01-01

## 2017-12-30 RX ORDER — POTASSIUM CHLORIDE 20 MEQ
0 PACKET (EA) ORAL
Qty: 0 | Refills: 0 | COMMUNITY

## 2017-12-30 RX ORDER — ERGOCALCIFEROL 1.25 MG/1
5000 CAPSULE ORAL
Qty: 0 | Refills: 0 | COMMUNITY

## 2017-12-30 RX ORDER — SODIUM CHLORIDE 9 MG/ML
1000 INJECTION INTRAMUSCULAR; INTRAVENOUS; SUBCUTANEOUS
Qty: 0 | Refills: 0 | Status: DISCONTINUED | OUTPATIENT
Start: 2017-12-30 | End: 2018-01-01

## 2017-12-30 RX ORDER — PANTOPRAZOLE SODIUM 20 MG/1
20 TABLET, DELAYED RELEASE ORAL DAILY
Qty: 0 | Refills: 0 | Status: DISCONTINUED | OUTPATIENT
Start: 2017-12-30 | End: 2018-01-01

## 2017-12-30 RX ORDER — GABAPENTIN 400 MG/1
100 CAPSULE ORAL
Qty: 0 | Refills: 0 | Status: DISCONTINUED | OUTPATIENT
Start: 2017-12-30 | End: 2018-01-01

## 2017-12-30 RX ORDER — SIMVASTATIN 20 MG/1
20 TABLET, FILM COATED ORAL AT BEDTIME
Qty: 0 | Refills: 0 | Status: DISCONTINUED | OUTPATIENT
Start: 2017-12-30 | End: 2018-01-01

## 2017-12-30 RX ORDER — LATANOPROST 0.05 MG/ML
1 SOLUTION/ DROPS OPHTHALMIC; TOPICAL AT BEDTIME
Qty: 0 | Refills: 0 | Status: DISCONTINUED | OUTPATIENT
Start: 2017-12-30 | End: 2018-01-01

## 2017-12-30 RX ORDER — FAMOTIDINE 10 MG/ML
0 INJECTION INTRAVENOUS
Qty: 0 | Refills: 0 | COMMUNITY

## 2017-12-30 RX ORDER — PREGABALIN 225 MG/1
1000 CAPSULE ORAL DAILY
Qty: 0 | Refills: 0 | Status: DISCONTINUED | OUTPATIENT
Start: 2017-12-30 | End: 2018-01-01

## 2017-12-30 RX ORDER — QUETIAPINE FUMARATE 200 MG/1
25 TABLET, FILM COATED ORAL AT BEDTIME
Qty: 0 | Refills: 0 | Status: DISCONTINUED | OUTPATIENT
Start: 2017-12-30 | End: 2018-01-01

## 2017-12-30 RX ORDER — ACETAMINOPHEN 500 MG
650 TABLET ORAL ONCE
Qty: 0 | Refills: 0 | Status: COMPLETED | OUTPATIENT
Start: 2017-12-30 | End: 2017-12-30

## 2017-12-30 RX ORDER — FUROSEMIDE 40 MG
1 TABLET ORAL
Qty: 0 | Refills: 0 | COMMUNITY

## 2017-12-30 RX ADMIN — LATANOPROST 1 DROP(S): 0.05 SOLUTION/ DROPS OPHTHALMIC; TOPICAL at 21:26

## 2017-12-30 RX ADMIN — SODIUM CHLORIDE 75 MILLILITER(S): 9 INJECTION INTRAMUSCULAR; INTRAVENOUS; SUBCUTANEOUS at 11:05

## 2017-12-30 RX ADMIN — QUETIAPINE FUMARATE 25 MILLIGRAM(S): 200 TABLET, FILM COATED ORAL at 21:26

## 2017-12-30 RX ADMIN — Medication 250 MILLIGRAM(S): at 04:48

## 2017-12-30 RX ADMIN — PIPERACILLIN AND TAZOBACTAM 25 GRAM(S): 4; .5 INJECTION, POWDER, LYOPHILIZED, FOR SOLUTION INTRAVENOUS at 11:05

## 2017-12-30 RX ADMIN — CEFEPIME 100 MILLIGRAM(S): 1 INJECTION, POWDER, FOR SOLUTION INTRAMUSCULAR; INTRAVENOUS at 03:04

## 2017-12-30 RX ADMIN — PIPERACILLIN AND TAZOBACTAM 25 GRAM(S): 4; .5 INJECTION, POWDER, LYOPHILIZED, FOR SOLUTION INTRAVENOUS at 21:26

## 2017-12-30 RX ADMIN — Medication 650 MILLIGRAM(S): at 00:15

## 2017-12-30 RX ADMIN — SIMVASTATIN 20 MILLIGRAM(S): 20 TABLET, FILM COATED ORAL at 21:26

## 2017-12-30 NOTE — SWALLOW BEDSIDE ASSESSMENT ADULT - SWALLOW EVAL: RECOMMENDED FEEDING/EATING TECHNIQUES
allow for swallow between intakes/check mouth frequently for oral residue/pocketing/crush medication (when feasible)/no straws

## 2017-12-30 NOTE — SWALLOW BEDSIDE ASSESSMENT ADULT - NS SPL SWALLOW CLINIC TRIAL FT
Limited bedside study because Pt verbally and behaviorally refused more than one trial of po intake. However, note that there are no reports of prior dys[phagia, but that pt found to be dehydrated: suspect that pt has underlying preserved oral-pharyngeal function for po intake: but she is acutely debilitated at this time:  RX: PUREE, NECTAR THICK LIQUIDS: NO STRAW,  feed as tolerated slowly.  RX  Palliative Care consult for [facility].   pt is not a candidate for PG tube feeds:  advanced age and severe cognitive decline: alternative feeding will not benefit her.  Dsic with NSg  and with Palliative Care:  Service will follow

## 2017-12-30 NOTE — H&P ADULT - NSHPPHYSICALEXAM_GEN_ALL_CORE
PHYSICAL EXAM:    Daily     Daily     ICU Vital Signs Last 24 Hrs  T(C): 36.7 (30 Dec 2017 03:04), Max: 38.2 (30 Dec 2017 00:10)  T(F): 98 (30 Dec 2017 03:04), Max: 100.7 (30 Dec 2017 00:10)  HR: 97 (30 Dec 2017 03:04) (84 - 110)  BP: 135/64 (30 Dec 2017 03:04) (113/67 - 135/64)  BP(mean): --  ABP: --  ABP(mean): --  RR: 17 (30 Dec 2017 03:04) (16 - 20)  SpO2: 96% (30 Dec 2017 03:04) (96% - 98%)      Constitutional: NAD, appears cachectic  HEENT: Atraumatic, JHON, Normal, No congestion  Respiratory: right lung filed fine inspiratory crackle, decreased breath sounds left lung base, no rhonchi/wheeze  Cardiovascular: N S1S2; KOLBY present  Gastrointestinal: Abdomen soft, non tender, Bowel Ssounds present  Extremities: B/L LE 1+ edema, peripheral pulses present  Neurological: sleeping, easily arousable with verbal stimuli, O x 2, moves upper extremities more than lower extremities  Skin: Non cellulitic,     Back: No CVA tenderness   Musculoskeletal: non tender  Breasts: Deferred  Genitourinary: deferred  Rectal: Deferred

## 2017-12-30 NOTE — H&P ADULT - HISTORY OF PRESENT ILLNESS
89 Fhx HTN/HLD/Dementia/COPD presents to ED sent by [facility] for evaluation of sinus tachycardia 110 and to   r/o dehydration. , no fever.. In Ed  on arrival per ED physician patient was found to be  thin and frail appearing complaining of no pain..No fever in Ed  In ED CXR- rotated film cannot exclude right sided PNA   EKG  WBC 17, elevated lactate 2.8 >>2.9  received 2 liters NS IVF bolus and cefepime and vanco in ED  currently vitals stable   patient is a poor historian due to hx of dementia .she is sleeping ,but easily arousable with verbal stimuli, oriented to self, place -hospital  she is able to give minimal hx- denies any CP, SOB or abdominal pain or nausea or vomiting   most of the hx was derived from charts and [facility] papers    pmhx/pshx- dementia/HTN/HLD/COPD/Pulmonary nodules,vitamin b12 deficiency, GERD, hx of pelvic , hx of appendectomy, tonsillectomy,vaginal repair   social- lives in [facility]  family hx- non contributory    . 89 Fhx HTN/HLD/Dementia/COPD presents to ED sent by [facility] for evaluation of sinus tachycardia 110 and to   r/o dehydration. , no fever.. In Ed  on arrival per ED physician patient was found to be  thin and frail appearing complaining of no pain..No fever in Ed  In ED patient was hypotensive  and tachycardic 110, BP stabilised s/p IVF 2 L bolus and broad spectrum IV abx    CXR- rotated film cannot exclude right sided PNA   EKG  WBC 17, elevated lactate 2.8 >>2.9  received 2 liters NS IVF bolus and  IV cefepime and vanco IV in ED  currently vitals stable   patient is a poor historian due to hx of dementia .she is sleeping ,but easily arousable with verbal stimuli, oriented to self, place -hospital  she is able to give minimal hx- denies any CP, SOB or abdominal pain or nausea or vomiting   most of the hx was derived from charts and [facility] papers    pmhx/pshx- dementia/HTN/HLD/COPD/Pulmonary nodules,vitamin b12 deficiency, GERD, hx of pelvic , hx of appendectomy, tonsillectomy,vaginal repair   social- lives in [facility]  family hx- non contributory    . 89 Fhx HTN/HLD/Dementia/COPD presents to ED sent by [facility] for evaluation of sinus tachycardia 110 and to   r/o dehydration. , no fever.. In Ed  on arrival per ED physician patient was found to be  thin and frail appearing complaining of no pain..No fever in Ed  In ED patient was hypotensive  and tachycardic 110, BP stabilised s/p IVF 2 L bolus and broad spectrum IV abx    CXR- rotated film cannot exclude right sided PNA   EKG for 12/29/17- not available for review in [facility] EKG on in ED chart  WBC 17, elevated lactate 2.8 >>2.9  received 2 liters NS IVF bolus and  IV cefepime and vanco IV in ED  currently vitals stable   patient is a poor historian due to hx of dementia .she is sleeping ,but easily arousable with verbal stimuli, oriented to self, place -hospital  she is able to give minimal hx- denies any CP, SOB or abdominal pain or nausea or vomiting   most of the hx was derived from charts and [facility] papers    pmhx/pshx- dementia/HTN/HLD/COPD/Pulmonary nodules,vitamin b12 deficiency, GERD, hx of pelvic , hx of appendectomy, tonsillectomy,vaginal repair   social- lives in [facility]  family hx- non contributory    . 89 Fhx HTN/HLD/Dementia/COPD presents to ED sent by [facility] for evaluation of sinus tachycardia 110 and to   r/o dehydration. ,. In Ed  on arrival per ED physician patient was found to be  thin and frail appearing complaining of no pain.had fever 100.7 in ED  In ED patient was hypotensive  and tachycardic 110, BP stabilised s/p IVF 2 L bolus and broad spectrum IV abx    CXR- rotated film cannot exclude right sided PNA   EKG for 12/29/17- not available for review in [facility] EKG on in ED chart  WBC 17, elevated lactate 2.8 >>2.9  received 2 liters NS IVF bolus and  IV cefepime and vanco IV in ED  currently vitals stable   patient is a poor historian due to hx of dementia .she is sleeping ,but easily arousable with verbal stimuli, oriented to self, place -hospital  she is able to give minimal hx- denies any CP, SOB or abdominal pain or nausea or vomiting   most of the hx was derived from charts and [facility] papers    pmhx/pshx- dementia/HTN/HLD/COPD/Pulmonary nodules,vitamin b12 deficiency, GERD, hx of pelvic , hx of appendectomy, tonsillectomy,vaginal repair   social- lives in [facility]  family hx- non contributory    .

## 2017-12-30 NOTE — H&P ADULT - NSHPLABSRESULTS_GEN_ALL_CORE
9.6    17.3  )-----------( 288      ( 29 Dec 2017 20:48 )             29.8       CBC Full  -  ( 29 Dec 2017 20:48 )  WBC Count : 17.3 K/uL  Hemoglobin : 9.6 g/dL  Hematocrit : 29.8 %  Platelet Count - Automated : 288 K/uL  Mean Cell Volume : 94.2 fl  Mean Cell Hemoglobin : 30.5 pg  Mean Cell Hemoglobin Concentration : 32.4 gm/dL  Auto Neutrophil # : 15.2 K/uL  Auto Lymphocyte # : 0.8 K/uL  Auto Monocyte # : 1.2 K/uL  Auto Eosinophil # : 0.0 K/uL  Auto Basophil # : 0.1 K/uL  Auto Neutrophil % : 88.1 %  Auto Lymphocyte % : 4.4 %  Auto Monocyte % : 7.1 %  Auto Eosinophil % : 0.0 %  Auto Basophil % : 0.3 %          140  |  105  |  34<H>  ----------------------------<  157<H>  4.9   |  25  |  1.18    Ca    8.5      29 Dec 2017 20:48    TPro  6.4  /  Alb  3.4  /  TBili  0.4  /  DBili  <0.1  /  AST  37  /  ALT  22  /  AlkPhos  88        LIVER FUNCTIONS - ( 29 Dec 2017 20:48 )  Alb: 3.4 g/dL / Pro: 6.4 gm/dL / ALK PHOS: 88 U/L / ALT: 22 U/L / AST: 37 U/L / GGT: x                       Urinalysis Basic - ( 29 Dec 2017 20:48 )    Color: Yellow / Appearance: Clear / S.015 / pH: x  Gluc: x / Ketone: Negative  / Bili: Negative / Urobili: 1 mg/dL   Blood: x / Protein: 15 mg/dL / Nitrite: Negative   Leuk Esterase: Negative / RBC: 0-2 /HPF / WBC 0-2   Sq Epi: x / Non Sq Epi: Few / Bacteria: Few            MEDICATIONS  (STANDING):  piperacillin/tazobactam IVPB. 3.375 Gram(s) IV Intermittent every 12 hours  sodium chloride 0.9%. 1000 milliLiter(s) (75 mL/Hr) IV Continuous <Continuous>  vancomycin  IVPB 1000 milliGRAM(s) IV Intermittent once

## 2017-12-30 NOTE — CONSULT NOTE ADULT - SUBJECTIVE AND OBJECTIVE BOX
89 female with past medical history  HTN/HLD/Dementia/COPD admitted on  for evaluation of rapid heart rate while at assisted living; upon admission found to be hypotensive, with elevated lactate and wbc; history per medical record as patient unable to provide history. She is saying she is cold. Was also febrile upon admission.  Rule out sepsis.  CT of chest (images of upper abdomen) reveal 3 cm left renal cyst, 5 mm right renal lesion (hemorrhagic or dense cyst), mild hydro right (no IV contrast utilized).  No intervention of followup imaging required in this 89 yo female          PMH: as above  PSH: as above  Meds: per reconcilation sheet, noted below  MEDICATIONS  (STANDING):  cyanocobalamin 1000 MICROGram(s) Oral daily  gabapentin 100 milliGRAM(s) Oral two times a day  latanoprost 0.005% Ophthalmic Solution 1 Drop(s) Both EYES at bedtime  pantoprazole  Injectable 20 milliGRAM(s) IV Push daily  piperacillin/tazobactam IVPB. 3.375 Gram(s) IV Intermittent every 12 hours  QUEtiapine 25 milliGRAM(s) Oral at bedtime  simvastatin 20 milliGRAM(s) Oral at bedtime  sodium chloride 0.9%. 1000 milliLiter(s) (75 mL/Hr) IV Continuous <Continuous>    MEDICATIONS  (PRN):  ALBUTerol/ipratropium for Nebulization 3 milliLiter(s) Nebulizer every 4 hours PRN Shortness of Breath and/or Wheezing    Allergies    No Known Allergies    Intolerances      Social: no smoking, no alcohol, no illegal drugs; no recent travel, no exposure to TB  FAMILY HISTORY:    ROS: unable to obtain secondary to patient medical condition   Vital Signs Last 24 Hrs  T(C): 36.8 (30 Dec 2017 07:57), Max: 38.2 (30 Dec 2017 00:10)  T(F): 98.3 (30 Dec 2017 07:57), Max: 100.7 (30 Dec 2017 00:10)  HR: 98 (30 Dec 2017 07:57) (84 - 110)  BP: 119/57 (30 Dec 2017 07:57) (113/67 - 135/64)  BP(mean): --  RR: 18 (30 Dec 2017 07:57) (16 - 20)  SpO2: 100% (30 Dec 2017 07:57) (96% - 100%)  Daily     Daily   Constitutional: frail looking  HEENT: NC/AT  Neck: supple  Respiratory: clear, no r/r/w  Cardiovascular: S1S2 regular, no murmurs  Abdomen: soft, not tender, not distended, positive BS  Genitourinary: deferred  Rectal: deferred  Musculoskeletal: mild peripheral edema  Neurological: moving all extremities, no focal deficits  Skin: no rashes                          8.8    12.8  )-----------( 240      ( 30 Dec 2017 05:50 )             27.8     1230    140  |  106  |  38<H>  ----------------------------<  138<H>  4.9   |  28  |  1.18    Ca    8.3<L>      30 Dec 2017 05:50  Phos  4.9       Mg     2.2         TPro  6.2  /  Alb  3.1<L>  /  TBili  0.4  /  DBili  x   /  AST  34  /  ALT  19  /  AlkPhos  81       LIVER FUNCTIONS - ( 30 Dec 2017 05:50 )  Alb: 3.1 g/dL / Pro: 6.2 gm/dL / ALK PHOS: 81 U/L / ALT: 19 U/L / AST: 34 U/L / GGT: x           Urinalysis Basic - ( 29 Dec 2017 20:48 )    Color: Yellow / Appearance: Clear / S.015 / pH: x  Gluc: x / Ketone: Negative  / Bili: Negative / Urobili: 1 mg/dL   Blood: x / Protein: 15 mg/dL / Nitrite: Negative   Leuk Esterase: Negative / RBC: 0-2 /HPF / WBC 0-2   Sq Epi: x / Non Sq Epi: Few / Bacteria: Few          Radiology:< from: CT Chest No Cont (17 @ 04:26) >    EXAM:  CT CHEST                            PROCEDURE DATE:  2017          INTERPRETATION:  EXAM: CT CHEST WITHOUT CONTRAST    CLINICAL INFORMATION: Sepsis.  Rule out pneumonia.    TECHNIQUE: CT scan of the chest, without intravenous contrast,was   performed. Two dimensional and three-dimensional maximum intensity   projection reformats were generated.        COMPARISON STUDY:None.    FINDINGS:     Lungs: Grossly clear.  Pleura: No pleural effusion or pneumothorax.  Airways: Central airways are clear.    Heart: Heart size is unremarkable.  Heavy at this chronic calcification   of coronary arteries.  Moderate calcification of the aortic root.  Mediastinum/Munira: No lymphadenopathy.  Vasculature: No thoracic aortic aneurysm.  Moderate atherosclerotic   calcification of the visualized aorta and branch vessels.        Chest wall/lower neck: Unremarkable.    Bones: Severe thoracic kyphosis with mild to moderate compression   fractures at T4, T5, T6, T7, T8, T9 and T12.  There appears alejandro   fracture lucency in the T5 vertebral body extending into the right   pedicle which could be acute or subacute.  The T5 and T6 vertebral bodies   have a mottled appearance and pathologic fracture cannot be excluded.    Moderate to large compression fracture at L1 L1 containing   methylmethacrylate from prior vertebroplasty/kyphoplasty.  Severe   arthritic changes in both glenohumeral joints.      Assessment:  89 female with past medical history  HTN/HLD/Dementia/COPD admitted on  for evaluation of rapid heart rate while at assisted living; upon admission found to be hypotensive, with elevated lactate and wbc; history per medical record as patient unable to provide history. She is saying she is cold. Was also febrile upon admission.  Rule out sepsis.  CT of chest (images of upper abdomen) reveal 3 cm left renal cyst, 5 mm right renal lesion (hemorrhagic or dense cyst), mild hydro right (no IV contrast utilized).  No intervention of followup imaging required in this 89 yo female

## 2017-12-30 NOTE — SWALLOW BEDSIDE ASSESSMENT ADULT - ORAL PHASE
Within functional limits/buccal action observed for control of minimal bolus volume and on only one trial as pt refused further trial

## 2017-12-30 NOTE — SWALLOW BEDSIDE ASSESSMENT ADULT - PHARYNGEAL PHASE
Within functional limits/Swallow triggered rapidly by flow of liquid: no overt s/s aspiration on minimal  intake.

## 2017-12-30 NOTE — SWALLOW BEDSIDE ASSESSMENT ADULT - ORAL PREPARATORY PHASE
Within functional limits/acceptance was induced via neuromuscular facilitation techniqiues; reduced oral aperture and minimal action for drawing liquid from spoon: rapid flow:

## 2017-12-30 NOTE — ED ADULT NURSE REASSESSMENT NOTE - NS ED NURSE REASSESS COMMENT FT1
Patient received from [facility]. Patient resting comfortably in bed. Safety and comfort maintained. Will continue to monitor.

## 2017-12-30 NOTE — H&P ADULT - ASSESSMENT
A/P  # A/P  # Sepsis/R/O Right sided PNA suspected Gm neg /MRSA/ r/O aspiration PNA  #Acute anemia-probably consumptional secondary to sepsis/no evidence of active bleeding at this time  #NATTY likely prerenal azotemia from dehydration  # hx of COPd without acute  exacerbation  # hx of HTN/hyperlipidemia  #hx dementia  #DVT prophylaxis 89 Fhx HTN/HLD/Dementia/COPD presents to ED sent by [facility] for evaluation of sinus tachycardia 110 and to   r/o dehydration. , no fever. and was found to be hypotensive in Ed  A/P  # Sepsis/R/O Right sided PNA suspected Gm neg /MRSA/ r/O aspiration PNA  - Admit to med surg  - slow IVF s/p 2 Liters IVF bolus    due to risk of volume overload   -Zosyn IV , Iv vanco  -NPO, swallow eval in am  -pulmonary consult, ID consult  -urine legionella    #Acute anemia-probably consumptional secondary to sepsis/no evidence of  bleeding at this time  - monitor H/h closely  - FOBT    # B/l lower extremity edema secondary to fluid overload vs chronic  venous insufficiency  - venous doppler b/l lower extremity    #NATTY likely prerenal azotemia from dehydration/sepsis  -IVF  -monitor H/H    # hx of COPd without acute  exacerbation  neb tx prn    # hx of HTN/hyperlipidemia  -hold BP meds for now in lieu of sepsis and risk for hypotension    #hx dementia  - continue quetiapine    #DVT prophylaxis  lovenox sc    # DNR as per assisted living papers 89 Fhx HTN/HLD/Dementia/COPD presents to ED sent by [facility] for evaluation of sinus tachycardia 110 and to   r/o dehydration. , no fever. and was found to be hypotensive in Ed  A/P  # Sepsis/R/O Right sided PNA suspected Gm neg /MRSA/ r/O aspiration PNA  - Admit to med surg  - slow IVF s/p 2 Liters IVF bolus    due to risk of volume overload   -Zosyn IV , Iv vanco  -NPO, swallow eval in am  -pulmonary consult, ID consult  -urine legionella    #Acute anemia-probably consumptional secondary to sepsis/no evidence of  bleeding at this time  - monitor H/h closely  - FOBT  - hold asa for now, resume asa if h/h stable and no evidence of gi bleed    # B/l lower extremity edema secondary to fluid overload vs chronic  venous insufficiency  - venous doppler b/l lower extremity    #NATTY likely prerenal azotemia from dehydration/sepsis  -IVF  -monitor H/H    # hx of COPd without acute  exacerbation  neb tx prn    # hx of HTN/hyperlipidemia  -hold BP meds for now in lieu of sepsis and risk for hypotension    #hx dementia  - continue quetiapine    #DVT prophylaxis  start heparin sc if h/h stable and no evidence of gi bleed for now scd    # DNR as per assisted living papers 89 Fhx HTN/HLD/Dementia/COPD presents to ED sent by [facility] for evaluation of sinus tachycardia 110 and to   r/o dehydration. , no fever. and was found to be hypotensive in Ed  A/P  # Sepsis/R/O Right sided PNA suspected Gm neg /MRSA/ r/O aspiration PNA  - Admit to med surg  - slow IVF s/p 2 Liters IVF bolus    due to risk of volume overload   -Zosyn IV , Iv vanco  -NPO, swallow eval in am  -pulmonary consult, ID consult  -urine legionella    #Acute anemia-probably consumptional secondary to sepsis/no evidence of  bleeding at this time  - monitor H/h closely  - FOBT  - hold asa for now, resume asa if h/h stable and no evidence of gi bleed    # B/l lower extremity edema secondary to fluid overload vs chronic  venous insufficiency  - venous doppler b/l lower extremity    #NATTY likely prerenal azotemia from dehydration/sepsis  -IVF  -monitor H/H    # hx of COPd without acute  exacerbation  neb tx prn    # hx of HTN/hyperlipidemia  -hold BP meds for now in lieu of sepsis and risk for hypotension    #hx dementia  - continue quetiapine    #DVT prophylaxis  heparin sc BID ( wt <50 kg)    # DNR as per assisted living papers 89 Fhx HTN/HLD/Dementia/COPD presents to ED sent by [facility] for evaluation of sinus tachycardia 110 and to   r/o dehydration. ,fever 100.7 in ed hypotension/ and was found to be hypotensive in Ed  A/P  # Fever 100.7/hypotension/lactatemia- R/O Urosepsis/ Mild right  hydronephrosis with right renal hemorrhagic cyst  (CT chest -no PNA, no meningeal signs-unlikely acute meningitis)  - Admit to med surg  - slow IVF s/p 2 Liters IVF bolus    due to risk of volume overload   -Zosyn IV , Iv vanco  -NPO, swallow eval in am  -urology consult, ID consult  -blood cx, urine culture  -Clinically unlikely acute cholecystitis or  acute colitis (NO abdominal pain or tenderness,no diarrhea, novomitingLFT wnl)- unable to do Ct abd/pelvis with Iv contrast due to decreased renal function(GFR <40) -if sx do not improve with above management would consider CT abd/pelvis w/o contrast    #Acute anemia-multifactorial  consumptional secondary to sepsis/right hemorrhagic cyst   - monitor H/h closely  - FOBT  - hold asa for now,       # B/l lower extremity edema secondary to fluid overload vs chronic  venous insufficiency  - venous doppler b/l lower extremity    #NATTY likely prerenal azotemia from dehydration/sepsis  -IVF  -monitor H/H    # hx of COPd without acute  exacerbation  neb tx prn    # hx of HTN/hyperlipidemia  -hold BP meds for now in lieu of sepsis and risk for hypotension    #hx dementia  - continue quetiapine    #DVT prophylaxis  SCD after venous doppler done  no AC for now due to drop in H/h and right renal hemorrhagic cyst    # DNR as per assisted living papers 89 Fhx HTN/HLD/Dementia/COPD presents to ED sent by [facility] for evaluation of sinus tachycardia 110 and to   r/o dehydration. ,fever 100.7 in ed hypotension/ and was found to be hypotensive in Ed  A/P  # Fever 100.7/hypotension/lactatemia- R/O Urosepsis/ Mild right  hydronephrosis with right renal hemorrhagic cyst  (CT chest -no PNA, no meningeal signs-unlikely acute meningitis)  - Admit to med surg  - slow IVF s/p 2 Liters IVF bolus    due to risk of volume overload   -Zosyn IV , Iv vanco  -NPO, swallow eval in am  -urology consult, ID consult  -blood cx, urine culture  -Clinically unlikely acute cholecystitis or  acute colitis (NO abdominal pain or tenderness,no diarrhea, novomitingLFT wnl)- unable to do Ct abd/pelvis with Iv contrast due to decreased renal function(GFR <40) -if sx do not improve with above management would consider CT abd/pelvis w/o contrast    #Acute anemia-multifactorial  consumptional secondary to sepsis/right hemorrhagic cyst   - monitor H/h closely  - FOBT  - hold asa for now,       # B/l lower extremity edema secondary to fluid overload vs chronic  venous insufficiency  - venous doppler b/l lower extremity    #NATTY r/o post renal azotemia (mild right hydronephrosis) vs dehydration   -IVF  -monitor I/O q 4 hrs  -urology consult    # hx of COPd without acute  exacerbation  neb tx prn    # hx of HTN/hyperlipidemia  -hold BP meds for now in lieu of sepsis and risk for hypotension    #hx dementia  - continue quetiapine    #DVT prophylaxis  SCD after venous doppler done  no AC for now due to drop in H/h and right renal hemorrhagic cyst    # DNR as per assisted living papers

## 2017-12-30 NOTE — CONSULT NOTE ADULT - SUBJECTIVE AND OBJECTIVE BOX
HPI:  89 female with past medical history  HTN/HLD/Dementia/COPD admitted on  for evaluation of rapid heart rate while at assisted living; upon admission found to be hypotensive, with elevated lactate and wbc; history per medical record as patient unable to provide history. She is saying she is cold. Was also febrile upon admission.          PMH: as above  PSH: as above  Meds: per reconcilation sheet, noted below  MEDICATIONS  (STANDING):  cyanocobalamin 1000 MICROGram(s) Oral daily  gabapentin 100 milliGRAM(s) Oral two times a day  latanoprost 0.005% Ophthalmic Solution 1 Drop(s) Both EYES at bedtime  pantoprazole  Injectable 20 milliGRAM(s) IV Push daily  piperacillin/tazobactam IVPB. 3.375 Gram(s) IV Intermittent every 12 hours  QUEtiapine 25 milliGRAM(s) Oral at bedtime  simvastatin 20 milliGRAM(s) Oral at bedtime  sodium chloride 0.9%. 1000 milliLiter(s) (75 mL/Hr) IV Continuous <Continuous>    MEDICATIONS  (PRN):  ALBUTerol/ipratropium for Nebulization 3 milliLiter(s) Nebulizer every 4 hours PRN Shortness of Breath and/or Wheezing    Allergies    No Known Allergies    Intolerances      Social: no smoking, no alcohol, no illegal drugs; no recent travel, no exposure to TB  FAMILY HISTORY:    ROS: unable to obtain secondary to patient medical condition   Vital Signs Last 24 Hrs  T(C): 36.8 (30 Dec 2017 07:57), Max: 38.2 (30 Dec 2017 00:10)  T(F): 98.3 (30 Dec 2017 07:57), Max: 100.7 (30 Dec 2017 00:10)  HR: 98 (30 Dec 2017 07:57) (84 - 110)  BP: 119/57 (30 Dec 2017 07:57) (113/67 - 135/64)  BP(mean): --  RR: 18 (30 Dec 2017 07:57) (16 - 20)  SpO2: 100% (30 Dec 2017 07:57) (96% - 100%)  Daily     Daily   Constitutional: frail looking  HEENT: NC/AT  Neck: supple  Respiratory: clear, no r/r/w  Cardiovascular: S1S2 regular, no murmurs  Abdomen: soft, not tender, not distended, positive BS  Genitourinary: deferred  Rectal: deferred  Musculoskeletal: mild peripheral edema  Neurological: moving all extremities, no focal deficits  Skin: no rashes                          8.8    12.8  )-----------( 240      ( 30 Dec 2017 05:50 )             27.8     30    140  |  106  |  38<H>  ----------------------------<  138<H>  4.9   |  28  |  1.18    Ca    8.3<L>      30 Dec 2017 05:50  Phos  4.9       Mg     2.2         TPro  6.2  /  Alb  3.1<L>  /  TBili  0.4  /  DBili  x   /  AST  34  /  ALT  19  /  AlkPhos  81       LIVER FUNCTIONS - ( 30 Dec 2017 05:50 )  Alb: 3.1 g/dL / Pro: 6.2 gm/dL / ALK PHOS: 81 U/L / ALT: 19 U/L / AST: 34 U/L / GGT: x           Urinalysis Basic - ( 29 Dec 2017 20:48 )    Color: Yellow / Appearance: Clear / S.015 / pH: x  Gluc: x / Ketone: Negative  / Bili: Negative / Urobili: 1 mg/dL   Blood: x / Protein: 15 mg/dL / Nitrite: Negative   Leuk Esterase: Negative / RBC: 0-2 /HPF / WBC 0-2   Sq Epi: x / Non Sq Epi: Few / Bacteria: Few          Radiology:< from: CT Chest No Cont (17 @ 04:26) >    EXAM:  CT CHEST                            PROCEDURE DATE:  2017          INTERPRETATION:  EXAM: CT CHEST WITHOUT CONTRAST    CLINICAL INFORMATION: Sepsis.  Rule out pneumonia.    TECHNIQUE: CT scan of the chest, without intravenous contrast,was   performed. Two dimensional and three-dimensional maximum intensity   projection reformats were generated.        COMPARISON STUDY:None.    FINDINGS:     Lungs: Grossly clear.  Pleura: No pleural effusion or pneumothorax.  Airways: Central airways are clear.    Heart: Heart size is unremarkable.  Heavy at this chronic calcification   of coronary arteries.  Moderate calcification of the aortic root.  Mediastinum/Munira: No lymphadenopathy.  Vasculature: No thoracic aortic aneurysm.  Moderate atherosclerotic   calcification of the visualized aorta and branch vessels.        Chest wall/lower neck: Unremarkable.    Bones: Severe thoracic kyphosis with mild to moderate compression   fractures at T4, T5, T6, T7, T8, T9 and T12.  There appears alejandro   fracture lucency in the T5 vertebral body extending into the right   pedicle which could be acute or subacute.  The T5 and T6 vertebral bodies   have a mottled appearance and pathologic fracture cannot be excluded.    Moderate to large compression fracture at L1 L1 containing   methylmethacrylate from prior vertebroplasty/kyphoplasty.  Severe   arthritic changes in both glenohumeral joints.    Visualized upper abdomen: Approximately 3 cm left renal cyst 5 mm   hypodense right renal lesion compatible with a hemorrhagic cyst.    Fullness versus mild hydronephrosis in the partially visualized right   renal collecting system.      IMPRESSION:    No gross CT evidence of pneumonia or congestive heart failure.  No focal   infiltrate, pleural effusion or pneumothorax.    Severe thoracic kyphosis with mild to moderate compression fractures at   T4-T9 and at T12.  Moderate to large compression fracture at L1   containing methylmethacrylate from prior vertebroplasty/kyphoplasty.    There appears to be fracture lucency in the T5 vertebral body extending   into the right pedicle which could be acute or subacute.  The T5 and T6   vertebral bodies have a mottled appearance of pathologic fracture cannot   be excluded.  Bone scan or MRI can be performed as clinically warranted.      < end of copied text >      Advanced directive addressed: full resuscitation

## 2017-12-30 NOTE — SWALLOW BEDSIDE ASSESSMENT ADULT - SLP GENERAL OBSERVATIONS
On encounter, pt lying in bed, askew, intermittent cessation of breathing:  very pale and frail; slowly rousable, barely opening eyes and repeating "help me": unable to follow oral motor commands. But able to tell her name, and date of birth when questioned by Nsg.  resistant to poral examination: appears to have dentition.: no voluntary swallow or cough:  By observation, Pt appears to have no facial focality to prohibit motor speech or po intake.

## 2017-12-30 NOTE — SWALLOW BEDSIDE ASSESSMENT ADULT - SWALLOW EVAL: DIAGNOSIS
likely age-appropriate oral-pharyngeal skills but now presenting with severe reduction in function 2/2 to AMS.

## 2017-12-30 NOTE — SWALLOW BEDSIDE ASSESSMENT ADULT - SLP PERTINENT HISTORY OF CURRENT PROBLEM
pt, white female age 89, HX;   HTN/HLD/Dementia/COPD admitted on 12/29 for evaluation of rapid heart rate while at assisted living; upon admission found to be hypotensive, with elevated lactate and wbc; history per medical record as patient unable to provide history. She is saying she is cold. Was also febrile upon admission.

## 2017-12-31 DIAGNOSIS — E86.0 DEHYDRATION: ICD-10-CM

## 2017-12-31 DIAGNOSIS — J44.9 CHRONIC OBSTRUCTIVE PULMONARY DISEASE, UNSPECIFIED: ICD-10-CM

## 2017-12-31 DIAGNOSIS — A41.9 SEPSIS, UNSPECIFIED ORGANISM: ICD-10-CM

## 2017-12-31 DIAGNOSIS — S73.002A UNSPECIFIED SUBLUXATION OF LEFT HIP, INITIAL ENCOUNTER: ICD-10-CM

## 2017-12-31 DIAGNOSIS — F03.90 UNSPECIFIED DEMENTIA WITHOUT BEHAVIORAL DISTURBANCE: ICD-10-CM

## 2017-12-31 DIAGNOSIS — E78.5 HYPERLIPIDEMIA, UNSPECIFIED: ICD-10-CM

## 2017-12-31 LAB
APTT BLD: 24.7 SEC — LOW (ref 27.5–37.4)
CULTURE RESULTS: NO GROWTH — SIGNIFICANT CHANGE UP
INR BLD: 0.85 RATIO — LOW (ref 0.88–1.16)
LEGIONELLA AG UR QL: NEGATIVE — SIGNIFICANT CHANGE UP
PROTHROM AB SERPL-ACNC: 9.1 SEC — LOW (ref 9.8–12.7)
SPECIMEN SOURCE: SIGNIFICANT CHANGE UP

## 2017-12-31 PROCEDURE — 93010 ELECTROCARDIOGRAM REPORT: CPT

## 2017-12-31 PROCEDURE — 73110 X-RAY EXAM OF WRIST: CPT | Mod: 26,RT

## 2017-12-31 PROCEDURE — 73502 X-RAY EXAM HIP UNI 2-3 VIEWS: CPT | Mod: 26

## 2017-12-31 PROCEDURE — 73552 X-RAY EXAM OF FEMUR 2/>: CPT | Mod: 26

## 2017-12-31 RX ORDER — HEPARIN SODIUM 5000 [USP'U]/ML
5000 INJECTION INTRAVENOUS; SUBCUTANEOUS EVERY 8 HOURS
Qty: 0 | Refills: 0 | Status: DISCONTINUED | OUTPATIENT
Start: 2017-12-31 | End: 2017-12-31

## 2017-12-31 RX ORDER — ACETAMINOPHEN 500 MG
1000 TABLET ORAL ONCE
Qty: 0 | Refills: 0 | Status: COMPLETED | OUTPATIENT
Start: 2017-12-31 | End: 2017-12-31

## 2017-12-31 RX ADMIN — Medication 1000 MILLIGRAM(S): at 23:25

## 2017-12-31 RX ADMIN — PANTOPRAZOLE SODIUM 20 MILLIGRAM(S): 20 TABLET, DELAYED RELEASE ORAL at 11:53

## 2017-12-31 RX ADMIN — GABAPENTIN 100 MILLIGRAM(S): 400 CAPSULE ORAL at 05:12

## 2017-12-31 RX ADMIN — SODIUM CHLORIDE 75 MILLILITER(S): 9 INJECTION INTRAMUSCULAR; INTRAVENOUS; SUBCUTANEOUS at 05:12

## 2017-12-31 RX ADMIN — PIPERACILLIN AND TAZOBACTAM 25 GRAM(S): 4; .5 INJECTION, POWDER, LYOPHILIZED, FOR SOLUTION INTRAVENOUS at 05:12

## 2017-12-31 RX ADMIN — Medication 400 MILLIGRAM(S): at 22:59

## 2017-12-31 RX ADMIN — PIPERACILLIN AND TAZOBACTAM 25 GRAM(S): 4; .5 INJECTION, POWDER, LYOPHILIZED, FOR SOLUTION INTRAVENOUS at 17:14

## 2017-12-31 NOTE — PROGRESS NOTE ADULT - SUBJECTIVE AND OBJECTIVE BOX
Subjective:  agitated    MEDICATIONS  (STANDING):  cyanocobalamin 1000 MICROGram(s) Oral daily  gabapentin 100 milliGRAM(s) Oral two times a day  latanoprost 0.005% Ophthalmic Solution 1 Drop(s) Both EYES at bedtime  pantoprazole  Injectable 20 milliGRAM(s) IV Push daily  piperacillin/tazobactam IVPB. 3.375 Gram(s) IV Intermittent every 12 hours  QUEtiapine 25 milliGRAM(s) Oral at bedtime  simvastatin 20 milliGRAM(s) Oral at bedtime  sodium chloride 0.9%. 1000 milliLiter(s) (75 mL/Hr) IV Continuous <Continuous>    MEDICATIONS  (PRN):  ALBUTerol/ipratropium for Nebulization 3 milliLiter(s) Nebulizer every 4 hours PRN Shortness of Breath and/or Wheezing      Allergies    No Known Allergies    Intolerances        REVIEW OF SYSTEMS: agitated, dementia        Vital Signs Last 24 Hrs  T(C): 36.8 (31 Dec 2017 13:18), Max: 37.4 (31 Dec 2017 05:38)  T(F): 98.3 (31 Dec 2017 13:18), Max: 99.4 (31 Dec 2017 05:38)  HR: 77 (31 Dec 2017 13:18) (77 - 86)  BP: 136/59 (31 Dec 2017 13:18) (102/69 - 152/35)  BP(mean): --  RR: 16 (31 Dec 2017 13:18) (16 - 17)  SpO2: 96% (31 Dec 2017 13:18) (92% - 96%)    PHYSICAL EXAMINATION:  SKIN: no rashes  HEAD: NC/AT  EYES: PERRLA, EOMI  EARS: TM's intact  NOSE: no abnormalities  NECK:  Supple. No lymphadenopathy. Jugular venous pressure not elevated. Carotids equal.   HEART:   S1S2 tachy  CHEST:  bilat ronchi  ABDOMEN:  Soft and nontender.   EXTREMITIES:  left hip externally rotated with hematoma hip and upper thigh  NEURO:demented       LABS:                        8.8    12.8  )-----------( 240      ( 30 Dec 2017 05:50 )             27.8     1230    140  |  106  |  38<H>  ----------------------------<  138<H>  4.9   |  28  |  1.18    Ca    8.3<L>      30 Dec 2017 05:50  Phos  4.9     12-  Mg     2.2     -30    TPro  6.2  /  Alb  3.1<L>  /  TBili  0.4  /  DBili  x   /  AST  34  /  ALT  19  /  AlkPhos  81  12-      Urinalysis Basic - ( 29 Dec 2017 20:48 )    Color: Yellow / Appearance: Clear / S.015 / pH: x  Gluc: x / Ketone: Negative  / Bili: Negative / Urobili: 1 mg/dL   Blood: x / Protein: 15 mg/dL / Nitrite: Negative   Leuk Esterase: Negative / RBC: 0-2 /HPF / WBC 0-2   Sq Epi: x / Non Sq Epi: Few / Bacteria: Few        RADIOLOGY & ADDITIONAL TESTS:

## 2017-12-31 NOTE — CONSULT NOTE ADULT - SUBJECTIVE AND OBJECTIVE BOX
89y Female presents w/ left hip fracture found on Xray while being admitted to . pt was admitted 2 days ago for malnutrition from [facility]. abnormal leg positioning during routine basic ADLs and xray ordered showing hip fracture. unknown when fall/injury occured. unable to fully asses 2/2 baseline dementia but pt denies any other pain. multiple attempts made to contact family. will continue to attempt.     HEALTH ISSUES - PROBLEM Dx:  HLD (hyperlipidemia): HLD (hyperlipidemia)  COPD (chronic obstructive pulmonary disease): COPD (chronic obstructive pulmonary disease)  Left hip subluxation: Left hip subluxation  Dementia: Dementia  Dehydration: Dehydration  Sepsis: Sepsis  Complex renal cyst: Complex renal cyst      Allergies    No Known Allergies    Intolerances                            8.8    12.8  )-----------( 240      ( 30 Dec 2017 05:50 )             27.8     30 Dec 2017 05:50    140    |  106    |  38     ----------------------------<  138    4.9     |  28     |  1.18     Ca    8.3        30 Dec 2017 05:50  Phos  4.9       30 Dec 2017 05:50  Mg     2.2       30 Dec 2017 05:50    TPro  6.2    /  Alb  3.1    /  TBili  0.4    /  DBili  x      /  AST  34     /  ALT  19     /  AlkPhos  81     30 Dec 2017 05:50      Vital Signs Last 24 Hrs  T(C): 36.8 (12-31-17 @ 13:18), Max: 37.4 (12-31-17 @ 05:38)  T(F): 98.3 (12-31-17 @ 13:18), Max: 99.4 (12-31-17 @ 05:38)  HR: 77 (12-31-17 @ 13:18) (77 - 86)  BP: 136/59 (12-31-17 @ 13:18) (102/69 - 152/35)  BP(mean): --  RR: 16 (12-31-17 @ 13:18) (16 - 17)  SpO2: 96% (12-31-17 @ 13:18) (92% - 96%)  Imaging: XR demonstates L hip fracture  fu xr femur    Physical Exam  Gen: NAD  L LE: skin intact, ecchymosis, unable to fully cooperate with exam based on baseline dementia, unable to SLR L LE, no pain with log roll L LE, minimal pain, no ttp elsewhere, motor and sensation grossly intact, no calf ttp, dp/pt pulse intact, compartments soft  Secondary survey: benign, unable to participate in exam, mild ecchymosis over right forearm/wrist, no ttp over wrist/no pain with ROM wrist, negative log roll contralateral leg, no bony ttp elsewhere 89y Female presents w/ left hip fracture found on Xray while being admitted to . pt was admitted 2 days ago for malnutrition from [facility]. abnormal leg positioning during routine basic ADLs and xray ordered showing hip fracture. unknown when fall/injury occured. unable to fully asses 2/2 baseline dementia but pt denies any other pain. multiple attempts made to contact family. will continue to attempt. family made aware and agreeable to surgery.    HEALTH ISSUES - PROBLEM Dx:  HLD (hyperlipidemia): HLD (hyperlipidemia)  COPD (chronic obstructive pulmonary disease): COPD (chronic obstructive pulmonary disease)  Left hip subluxation: Left hip subluxation  Dementia: Dementia  Dehydration: Dehydration  Sepsis: Sepsis  Complex renal cyst: Complex renal cyst      Allergies    No Known Allergies    Intolerances                            8.8    12.8  )-----------( 240      ( 30 Dec 2017 05:50 )             27.8     30 Dec 2017 05:50    140    |  106    |  38     ----------------------------<  138    4.9     |  28     |  1.18     Ca    8.3        30 Dec 2017 05:50  Phos  4.9       30 Dec 2017 05:50  Mg     2.2       30 Dec 2017 05:50    TPro  6.2    /  Alb  3.1    /  TBili  0.4    /  DBili  x      /  AST  34     /  ALT  19     /  AlkPhos  81     30 Dec 2017 05:50      Vital Signs Last 24 Hrs  T(C): 36.8 (12-31-17 @ 13:18), Max: 37.4 (12-31-17 @ 05:38)  T(F): 98.3 (12-31-17 @ 13:18), Max: 99.4 (12-31-17 @ 05:38)  HR: 77 (12-31-17 @ 13:18) (77 - 86)  BP: 136/59 (12-31-17 @ 13:18) (102/69 - 152/35)  BP(mean): --  RR: 16 (12-31-17 @ 13:18) (16 - 17)  SpO2: 96% (12-31-17 @ 13:18) (92% - 96%)  Imaging: XR demonstates L hip fracture  fu xr femur    Physical Exam  Gen: NAD  L LE: skin intact, ecchymosis, unable to fully cooperate with exam based on baseline dementia, unable to SLR L LE, no pain with log roll L LE, minimal pain, no ttp elsewhere, motor and sensation grossly intact, no calf ttp, dp/pt pulse intact, compartments soft  Secondary survey: benign, unable to participate in exam, mild ecchymosis over right forearm/wrist, no ttp over wrist/no pain with ROM wrist, negative log roll contralateral leg, no bony ttp elsewhere 89y Female presents w/ left hip fracture found on Xray while being admitted to . pt was admitted 2 days ago for malnutrition from [facility]. abnormal leg positioning during routine basic ADLs and xray ordered showing hip fracture. unknown when fall/injury occured. unable to fully asses 2/2 baseline dementia but pt denies any other pain. multiple attempts made to contact family. will continue to attempt. family made aware and agreeable to surgery.    HEALTH ISSUES - PROBLEM Dx:  HLD (hyperlipidemia): HLD (hyperlipidemia)  COPD (chronic obstructive pulmonary disease): COPD (chronic obstructive pulmonary disease)  Left hip subluxation: Left hip subluxation  Dementia: Dementia  Dehydration: Dehydration  Sepsis: Sepsis  Complex renal cyst: Complex renal cyst      Allergies    No Known Allergies    Intolerances                            8.8    12.8  )-----------( 240      ( 30 Dec 2017 05:50 )             27.8     30 Dec 2017 05:50    140    |  106    |  38     ----------------------------<  138    4.9     |  28     |  1.18     Ca    8.3        30 Dec 2017 05:50  Phos  4.9       30 Dec 2017 05:50  Mg     2.2       30 Dec 2017 05:50    TPro  6.2    /  Alb  3.1    /  TBili  0.4    /  DBili  x      /  AST  34     /  ALT  19     /  AlkPhos  81     30 Dec 2017 05:50      Vital Signs Last 24 Hrs  T(C): 36.8 (12-31-17 @ 13:18), Max: 37.4 (12-31-17 @ 05:38)  T(F): 98.3 (12-31-17 @ 13:18), Max: 99.4 (12-31-17 @ 05:38)  HR: 77 (12-31-17 @ 13:18) (77 - 86)  BP: 136/59 (12-31-17 @ 13:18) (102/69 - 152/35)  BP(mean): --  RR: 16 (12-31-17 @ 13:18) (16 - 17)  SpO2: 96% (12-31-17 @ 13:18) (92% - 96%)  Imaging: XR demonstates L hip fracture  fu xr femur  fu xr right wrist  Physical Exam  Gen: NAD  L LE: skin intact, minimal ecchymosis, unable to fully cooperate with exam based on baseline dementia, unable to SLR L LE, no pain with log roll L LE, minimal pain, no ttp elsewhere, motor and sensation grossly intact, no calf ttp, dp/pt pulse intact, compartments soft  Secondary survey: benign, unable to participate in exam, mild ecchymosis over right forearm/wrist, no ttp over wrist/no pain with ROM wrist, negative log roll contralateral leg, no bony ttp elsewhere

## 2017-12-31 NOTE — DIETITIAN INITIAL EVALUATION ADULT. - ENERGY NEEDS
Ht.  60   "        Wt.  78  #              BMI    15.2              IBW   111 #             Pt is at   70 %  IBW

## 2017-12-31 NOTE — CHART NOTE - NSCHARTNOTEFT_GEN_A_CORE
Upon Nutritional Assessment by the Registered Dietitian your patient was determined to meet criteria / has evidence of the following diagnosis/diagnoses:          [ ]  Mild Protein Calorie Malnutrition        [ ]  Moderate Protein Calorie Malnutrition        [x ] Severe Protein Calorie Malnutrition        [ ] Unspecified Protein Calorie Malnutrition        [ x] Underweight / BMI <19        [ ] Morbid Obesity / BMI > 40      Findings as based on:  •  Comprehensive nutrition assessment and consultation  •  Calorie counts (nutrient intake analysis)  •  Food acceptance and intake status from observations by staff  •  Follow up  •  Patient education  •  Intervention secondary to interdisciplinary rounds  •   concerns      Treatment:    1) Continue with Aspiration protocol (feed pt when only alert).  2) Ensure enlive 8oz. po TID (nectar thick).   3) Prosource 1oz. po TID mixed with nectar thick juice.   4) Palliative care consult for expanding advanced directives if needed.     The following diet has been recommended:    Continue with pureed diet + Nectar thick fluids (Aspiration precautions)  PROVIDER Section:     By signing this assessment you are acknowledging and agree with the diagnosis/diagnoses assigned by the Registered Dietitian    Comments:

## 2017-12-31 NOTE — DIETITIAN INITIAL EVALUATION ADULT. - OTHER INFO
Nutrition consult for FTT. Nutrition consult for FTT. Dx: PNA with dehydration. PMHX: dementia, GERD, HLD, HTN,COPD. Non verbal at this time and lethargic. As per aide unable to feed pt breakfast due to lethargy (high risk of aspiration). Will continue to attempt to feed pt when alert. S/P SLP evaluation with recommendations of pureed/nectar thick liquids. Pt resides at [facility] living [facility] [place] [facility]. Skin: Left malleolus stage 1 with +2/+3 edema documented. Pt not meeting estimated po needs with <25% intake possibly due to advancing age 89yo and current medical status. Advanced directives +DNR.  NFPE indicates severe muscle wasting: Temporal, Scapula, Clavicle, Acromion process, calves, patella, and quads. Severe fat loss: Triceps, Ribs, and Ocular.  Pt meets criteria for severe protein/calorie malnutrition in context of chronic illness secondary to severe muscle/fat wasting. Recommendations: 1) Continue with Aspiration protocol (feed pt when only alert). 2) Ensure enlive 8oz. po TID (nectar thick). 3) Prosource 1oz. po TID mixed with nectar thick juice. 4) Palliative care consult for expanding advanced directives if needed.

## 2017-12-31 NOTE — DIETITIAN INITIAL EVALUATION ADULT. - NS AS NUTRI DX NUTRIENT
Malnutrition/Pt meets criteria for severe protein/calorie malnutrition in context of chronic illness secondary to severe muscle/fat wasting.

## 2017-12-31 NOTE — PROGRESS NOTE ADULT - SUBJECTIVE AND OBJECTIVE BOX
HPI:  89 female with past medical history  HTN/HLD/Dementia/COPD admitted on  for evaluation of rapid heart rate while at assisted living; upon admission found to be hypotensive, with elevated lactate and wbc; history per medical record as patient unable to provide history. She is saying she is cold. Was also febrile upon admission.  Today  patient sleepy    MEDICATIONS  (STANDING):  cyanocobalamin 1000 MICROGram(s) Oral daily  gabapentin 100 milliGRAM(s) Oral two times a day  latanoprost 0.005% Ophthalmic Solution 1 Drop(s) Both EYES at bedtime  pantoprazole  Injectable 20 milliGRAM(s) IV Push daily  piperacillin/tazobactam IVPB. 3.375 Gram(s) IV Intermittent every 12 hours  QUEtiapine 25 milliGRAM(s) Oral at bedtime  simvastatin 20 milliGRAM(s) Oral at bedtime  sodium chloride 0.9%. 1000 milliLiter(s) (75 mL/Hr) IV Continuous <Continuous>    MEDICATIONS  (PRN):  ALBUTerol/ipratropium for Nebulization 3 milliLiter(s) Nebulizer every 4 hours PRN Shortness of Breath and/or Wheezing      Vital Signs Last 24 Hrs  T(C): 37.4 (31 Dec 2017 05:38), Max: 37.5 (30 Dec 2017 09:45)  T(F): 99.4 (31 Dec 2017 05:38), Max: 99.5 (30 Dec 2017 09:45)  HR: 86 (31 Dec 2017 05:22) (80 - 90)  BP: 152/35 (31 Dec 2017 05:22) (102/69 - 152/35)  BP(mean): --  RR: 17 (31 Dec 2017 05:22) (16 - 17)  SpO2: 93% (31 Dec 2017 05:22) (90% - 96%)    Physical Exam:          Constitutional: frail looking  HEENT: NC/AT  Neck: supple  Respiratory: clear, no r/r/w  Cardiovascular: S1S2 regular, no murmurs  Abdomen: soft, not tender, not distended, positive BS  Genitourinary: deferred  Rectal: deferred  Musculoskeletal: mild peripheral edema  Neurological: moving all extremities, no focal deficits  Skin: no rashes        Labs:                        8.8    12.8  )-----------( 240      ( 30 Dec 2017 05:50 )             27.8     12-    140  |  106  |  38<H>  ----------------------------<  138<H>  4.9   |  28  |  1.18    Ca    8.3<L>      30 Dec 2017 05:50  Phos  4.9     12-30  Mg     2.2     12-30    TPro  6.2  /  Alb  3.1<L>  /  TBili  0.4  /  DBili  x   /  AST  34  /  ALT  19  /  AlkPhos  81             Cultures:       Culture - Blood (collected 17 @ 01:12)  Source: .Blood None  Preliminary Report (17 @ 08:01):    No growth to date.    Culture - Blood (collected 17 @ 00:09)  Source: .Blood Blood  Preliminary Report (17 @ 08:01):    No growth to date.                              8.8    12.8  )-----------( 240      ( 30 Dec 2017 05:50 )             27.8         140  |  106  |  38<H>  ----------------------------<  138<H>  4.9   |  28  |  1.18    Ca    8.3<L>      30 Dec 2017 05:50  Phos  4.9     12-30  Mg     2.2     -30    TPro  6.2  /  Alb  3.1<L>  /  TBili  0.4  /  DBili  x   /  AST  34  /  ALT  19  /  AlkPhos  81  30     LIVER FUNCTIONS - ( 30 Dec 2017 05:50 )  Alb: 3.1 g/dL / Pro: 6.2 gm/dL / ALK PHOS: 81 U/L / ALT: 19 U/L / AST: 34 U/L / GGT: x           Urinalysis Basic - ( 29 Dec 2017 20:48 )    Color: Yellow / Appearance: Clear / S.015 / pH: x  Gluc: x / Ketone: Negative  / Bili: Negative / Urobili: 1 mg/dL   Blood: x / Protein: 15 mg/dL / Nitrite: Negative   Leuk Esterase: Negative / RBC: 0-2 /HPF / WBC 0-2   Sq Epi: x / Non Sq Epi: Few / Bacteria: Few          Radiology:< from: CT Chest No Cont (17 @ 04:26) >    EXAM:  CT CHEST                            PROCEDURE DATE:  2017          INTERPRETATION:  EXAM: CT CHEST WITHOUT CONTRAST    CLINICAL INFORMATION: Sepsis.  Rule out pneumonia.    TECHNIQUE: CT scan of the chest, without intravenous contrast,was   performed. Two dimensional and three-dimensional maximum intensity   projection reformats were generated.        COMPARISON STUDY:None.    FINDINGS:     Lungs: Grossly clear.  Pleura: No pleural effusion or pneumothorax.  Airways: Central airways are clear.    Heart: Heart size is unremarkable.  Heavy at this chronic calcification   of coronary arteries.  Moderate calcification of the aortic root.  Mediastinum/Munira: No lymphadenopathy.  Vasculature: No thoracic aortic aneurysm.  Moderate atherosclerotic   calcification of the visualized aorta and branch vessels.        Chest wall/lower neck: Unremarkable.    Bones: Severe thoracic kyphosis with mild to moderate compression   fractures at T4, T5, T6, T7, T8, T9 and T12.  There appears alejandro   fracture lucency in the T5 vertebral body extending into the right   pedicle which could be acute or subacute.  The T5 and T6 vertebral bodies   have a mottled appearance and pathologic fracture cannot be excluded.    Moderate to large compression fracture at L1 L1 containing   methylmethacrylate from prior vertebroplasty/kyphoplasty.  Severe   arthritic changes in both glenohumeral joints.    Visualized upper abdomen: Approximately 3 cm left renal cyst 5 mm   hypodense right renal lesion compatible with a hemorrhagic cyst.    Fullness versus mild hydronephrosis in the partially visualized right   renal collecting system.      IMPRESSION:    No gross CT evidence of pneumonia or congestive heart failure.  No focal   infiltrate, pleural effusion or pneumothorax.    Severe thoracic kyphosis with mild to moderate compression fractures at   T4-T9 and at T12.  Moderate to large compression fracture at L1   containing methylmethacrylate from prior vertebroplasty/kyphoplasty.    There appears to be fracture lucency in the T5 vertebral body extending   into the right pedicle which could be acute or subacute.  The T5 and T6   vertebral bodies have a mottled appearance of pathologic fracture cannot   be excluded.  Bone scan or MRI can be performed as clinically warranted.      < end of copied text >      Advanced directive addressed: full resuscitation

## 2018-01-01 DIAGNOSIS — D64.9 ANEMIA, UNSPECIFIED: ICD-10-CM

## 2018-01-01 LAB
ALLERGY+IMMUNOLOGY DIAG STUDY NOTE: SIGNIFICANT CHANGE UP
ANION GAP SERPL CALC-SCNC: 11 MMOL/L — SIGNIFICANT CHANGE UP (ref 5–17)
ANION GAP SERPL CALC-SCNC: 7 MMOL/L — SIGNIFICANT CHANGE UP (ref 5–17)
APTT BLD: 25.7 SEC — LOW (ref 27.5–37.4)
BUN SERPL-MCNC: 22 MG/DL — SIGNIFICANT CHANGE UP (ref 7–23)
BUN SERPL-MCNC: 26 MG/DL — HIGH (ref 7–23)
CALCIUM SERPL-MCNC: 7.9 MG/DL — LOW (ref 8.5–10.1)
CALCIUM SERPL-MCNC: 7.9 MG/DL — LOW (ref 8.5–10.1)
CHLORIDE SERPL-SCNC: 105 MMOL/L — SIGNIFICANT CHANGE UP (ref 96–108)
CHLORIDE SERPL-SCNC: 109 MMOL/L — HIGH (ref 96–108)
CO2 SERPL-SCNC: 23 MMOL/L — SIGNIFICANT CHANGE UP (ref 22–31)
CO2 SERPL-SCNC: 26 MMOL/L — SIGNIFICANT CHANGE UP (ref 22–31)
CREAT SERPL-MCNC: 0.44 MG/DL — LOW (ref 0.5–1.3)
CREAT SERPL-MCNC: 0.48 MG/DL — LOW (ref 0.5–1.3)
GLUCOSE SERPL-MCNC: 67 MG/DL — LOW (ref 70–99)
GLUCOSE SERPL-MCNC: 75 MG/DL — SIGNIFICANT CHANGE UP (ref 70–99)
HCT VFR BLD CALC: 20.2 % — CRITICAL LOW (ref 34.5–45)
HCT VFR BLD CALC: 32.8 % — LOW (ref 34.5–45)
HCT VFR BLD CALC: 36.5 % — SIGNIFICANT CHANGE UP (ref 34.5–45)
HGB BLD-MCNC: 10.9 G/DL — LOW (ref 11.5–15.5)
HGB BLD-MCNC: 12.1 G/DL — SIGNIFICANT CHANGE UP (ref 11.5–15.5)
HGB BLD-MCNC: 6.6 G/DL — CRITICAL LOW (ref 11.5–15.5)
INR BLD: 0.88 RATIO — SIGNIFICANT CHANGE UP (ref 0.88–1.16)
MCHC RBC-ENTMCNC: 29.9 PG — SIGNIFICANT CHANGE UP (ref 27–34)
MCHC RBC-ENTMCNC: 31.3 PG — SIGNIFICANT CHANGE UP (ref 27–34)
MCHC RBC-ENTMCNC: 32.8 GM/DL — SIGNIFICANT CHANGE UP (ref 32–36)
MCHC RBC-ENTMCNC: 33.2 GM/DL — SIGNIFICANT CHANGE UP (ref 32–36)
MCV RBC AUTO: 89.9 FL — SIGNIFICANT CHANGE UP (ref 80–100)
MCV RBC AUTO: 95.3 FL — SIGNIFICANT CHANGE UP (ref 80–100)
PLATELET # BLD AUTO: 159 K/UL — SIGNIFICANT CHANGE UP (ref 150–400)
PLATELET # BLD AUTO: 184 K/UL — SIGNIFICANT CHANGE UP (ref 150–400)
POTASSIUM SERPL-MCNC: 3.6 MMOL/L — SIGNIFICANT CHANGE UP (ref 3.5–5.3)
POTASSIUM SERPL-MCNC: 4 MMOL/L — SIGNIFICANT CHANGE UP (ref 3.5–5.3)
POTASSIUM SERPL-SCNC: 3.6 MMOL/L — SIGNIFICANT CHANGE UP (ref 3.5–5.3)
POTASSIUM SERPL-SCNC: 4 MMOL/L — SIGNIFICANT CHANGE UP (ref 3.5–5.3)
PROTHROM AB SERPL-ACNC: 9.5 SEC — LOW (ref 9.8–12.7)
RBC # BLD: 2.12 M/UL — LOW (ref 3.8–5.2)
RBC # BLD: 4.06 M/UL — SIGNIFICANT CHANGE UP (ref 3.8–5.2)
RBC # FLD: 13.2 % — SIGNIFICANT CHANGE UP (ref 10.3–14.5)
RBC # FLD: 15.1 % — HIGH (ref 10.3–14.5)
SODIUM SERPL-SCNC: 139 MMOL/L — SIGNIFICANT CHANGE UP (ref 135–145)
SODIUM SERPL-SCNC: 142 MMOL/L — SIGNIFICANT CHANGE UP (ref 135–145)
WBC # BLD: 11.1 K/UL — HIGH (ref 3.8–10.5)
WBC # BLD: 8.6 K/UL — SIGNIFICANT CHANGE UP (ref 3.8–10.5)
WBC # FLD AUTO: 11.1 K/UL — HIGH (ref 3.8–10.5)
WBC # FLD AUTO: 8.6 K/UL — SIGNIFICANT CHANGE UP (ref 3.8–10.5)

## 2018-01-01 PROCEDURE — 93010 ELECTROCARDIOGRAM REPORT: CPT

## 2018-01-01 RX ORDER — ONDANSETRON 8 MG/1
4 TABLET, FILM COATED ORAL EVERY 6 HOURS
Qty: 0 | Refills: 0 | Status: DISCONTINUED | OUTPATIENT
Start: 2018-01-02 | End: 2018-01-09

## 2018-01-01 RX ORDER — SIMVASTATIN 20 MG/1
20 TABLET, FILM COATED ORAL AT BEDTIME
Qty: 0 | Refills: 0 | Status: DISCONTINUED | OUTPATIENT
Start: 2018-01-01 | End: 2018-01-08

## 2018-01-01 RX ORDER — LATANOPROST 0.05 MG/ML
1 SOLUTION/ DROPS OPHTHALMIC; TOPICAL AT BEDTIME
Qty: 0 | Refills: 0 | Status: DISCONTINUED | OUTPATIENT
Start: 2018-01-01 | End: 2018-01-09

## 2018-01-01 RX ORDER — SODIUM CHLORIDE 9 MG/ML
1000 INJECTION INTRAMUSCULAR; INTRAVENOUS; SUBCUTANEOUS
Qty: 0 | Refills: 0 | Status: DISCONTINUED | OUTPATIENT
Start: 2018-01-01 | End: 2018-01-01

## 2018-01-01 RX ORDER — BENZOCAINE AND MENTHOL 5; 1 G/100ML; G/100ML
1 LIQUID ORAL
Qty: 0 | Refills: 0 | Status: DISCONTINUED | OUTPATIENT
Start: 2018-01-01 | End: 2018-01-09

## 2018-01-01 RX ORDER — CEFAZOLIN SODIUM 1 G
2000 VIAL (EA) INJECTION EVERY 8 HOURS
Qty: 0 | Refills: 0 | Status: COMPLETED | OUTPATIENT
Start: 2018-01-01 | End: 2018-01-02

## 2018-01-01 RX ORDER — PREGABALIN 225 MG/1
1000 CAPSULE ORAL DAILY
Qty: 0 | Refills: 0 | Status: DISCONTINUED | OUTPATIENT
Start: 2018-01-01 | End: 2018-01-08

## 2018-01-01 RX ORDER — SODIUM CHLORIDE 9 MG/ML
1000 INJECTION, SOLUTION INTRAVENOUS
Qty: 0 | Refills: 0 | Status: DISCONTINUED | OUTPATIENT
Start: 2018-01-01 | End: 2018-01-01

## 2018-01-01 RX ORDER — QUETIAPINE FUMARATE 200 MG/1
25 TABLET, FILM COATED ORAL AT BEDTIME
Qty: 0 | Refills: 0 | Status: DISCONTINUED | OUTPATIENT
Start: 2018-01-01 | End: 2018-01-08

## 2018-01-01 RX ORDER — PANTOPRAZOLE SODIUM 20 MG/1
20 TABLET, DELAYED RELEASE ORAL DAILY
Qty: 0 | Refills: 0 | Status: DISCONTINUED | OUTPATIENT
Start: 2018-01-01 | End: 2018-01-04

## 2018-01-01 RX ORDER — FOLIC ACID 0.8 MG
1 TABLET ORAL DAILY
Qty: 0 | Refills: 0 | Status: DISCONTINUED | OUTPATIENT
Start: 2018-01-01 | End: 2018-01-08

## 2018-01-01 RX ORDER — ASCORBIC ACID 60 MG
500 TABLET,CHEWABLE ORAL
Qty: 0 | Refills: 0 | Status: DISCONTINUED | OUTPATIENT
Start: 2018-01-01 | End: 2018-01-08

## 2018-01-01 RX ORDER — ONDANSETRON 8 MG/1
4 TABLET, FILM COATED ORAL ONCE
Qty: 0 | Refills: 0 | Status: DISCONTINUED | OUTPATIENT
Start: 2018-01-01 | End: 2018-01-01

## 2018-01-01 RX ORDER — HYDRALAZINE HCL 50 MG
5 TABLET ORAL
Qty: 0 | Refills: 0 | Status: DISCONTINUED | OUTPATIENT
Start: 2018-01-01 | End: 2018-01-08

## 2018-01-01 RX ORDER — ACETAMINOPHEN 500 MG
1000 TABLET ORAL ONCE
Qty: 0 | Refills: 0 | Status: COMPLETED | OUTPATIENT
Start: 2018-01-01 | End: 2018-01-01

## 2018-01-01 RX ORDER — ACETAMINOPHEN 500 MG
650 TABLET ORAL EVERY 6 HOURS
Qty: 0 | Refills: 0 | Status: DISCONTINUED | OUTPATIENT
Start: 2018-01-01 | End: 2018-01-09

## 2018-01-01 RX ORDER — OXYCODONE HYDROCHLORIDE 5 MG/1
10 TABLET ORAL EVERY 4 HOURS
Qty: 0 | Refills: 0 | Status: DISCONTINUED | OUTPATIENT
Start: 2018-01-02 | End: 2018-01-05

## 2018-01-01 RX ORDER — DIPHENHYDRAMINE HCL 50 MG
25 CAPSULE ORAL AT BEDTIME
Qty: 0 | Refills: 0 | Status: DISCONTINUED | OUTPATIENT
Start: 2018-01-01 | End: 2018-01-08

## 2018-01-01 RX ORDER — DOCUSATE SODIUM 100 MG
100 CAPSULE ORAL THREE TIMES A DAY
Qty: 0 | Refills: 0 | Status: DISCONTINUED | OUTPATIENT
Start: 2018-01-01 | End: 2018-01-08

## 2018-01-01 RX ORDER — ENOXAPARIN SODIUM 100 MG/ML
40 INJECTION SUBCUTANEOUS EVERY 24 HOURS
Qty: 0 | Refills: 0 | Status: DISCONTINUED | OUTPATIENT
Start: 2018-01-01 | End: 2018-01-08

## 2018-01-01 RX ORDER — HEPARIN SODIUM 5000 [USP'U]/ML
5000 INJECTION INTRAVENOUS; SUBCUTANEOUS ONCE
Qty: 0 | Refills: 0 | Status: COMPLETED | OUTPATIENT
Start: 2018-01-01 | End: 2018-01-01

## 2018-01-01 RX ORDER — SODIUM CHLORIDE 9 MG/ML
1000 INJECTION, SOLUTION INTRAVENOUS
Qty: 0 | Refills: 0 | Status: DISCONTINUED | OUTPATIENT
Start: 2018-01-01 | End: 2018-01-04

## 2018-01-01 RX ORDER — OXYCODONE HYDROCHLORIDE 5 MG/1
5 TABLET ORAL EVERY 4 HOURS
Qty: 0 | Refills: 0 | Status: DISCONTINUED | OUTPATIENT
Start: 2018-01-01 | End: 2018-01-01

## 2018-01-01 RX ORDER — HYDROMORPHONE HYDROCHLORIDE 2 MG/ML
0.5 INJECTION INTRAMUSCULAR; INTRAVENOUS; SUBCUTANEOUS EVERY 4 HOURS
Qty: 0 | Refills: 0 | Status: DISCONTINUED | OUTPATIENT
Start: 2018-01-02 | End: 2018-01-05

## 2018-01-01 RX ORDER — FENTANYL CITRATE 50 UG/ML
25 INJECTION INTRAVENOUS
Qty: 0 | Refills: 0 | Status: DISCONTINUED | OUTPATIENT
Start: 2018-01-01 | End: 2018-01-01

## 2018-01-01 RX ORDER — GABAPENTIN 400 MG/1
100 CAPSULE ORAL
Qty: 0 | Refills: 0 | Status: DISCONTINUED | OUTPATIENT
Start: 2018-01-01 | End: 2018-01-08

## 2018-01-01 RX ADMIN — PANTOPRAZOLE SODIUM 20 MILLIGRAM(S): 20 TABLET, DELAYED RELEASE ORAL at 13:22

## 2018-01-01 RX ADMIN — FENTANYL CITRATE 25 MICROGRAM(S): 50 INJECTION INTRAVENOUS at 21:09

## 2018-01-01 RX ADMIN — HEPARIN SODIUM 5000 UNIT(S): 5000 INJECTION INTRAVENOUS; SUBCUTANEOUS at 01:38

## 2018-01-01 RX ADMIN — PIPERACILLIN AND TAZOBACTAM 25 GRAM(S): 4; .5 INJECTION, POWDER, LYOPHILIZED, FOR SOLUTION INTRAVENOUS at 06:05

## 2018-01-01 RX ADMIN — SODIUM CHLORIDE 75 MILLILITER(S): 9 INJECTION INTRAMUSCULAR; INTRAVENOUS; SUBCUTANEOUS at 21:01

## 2018-01-01 RX ADMIN — Medication 400 MILLIGRAM(S): at 20:44

## 2018-01-01 RX ADMIN — SODIUM CHLORIDE 75 MILLILITER(S): 9 INJECTION, SOLUTION INTRAVENOUS at 01:38

## 2018-01-01 RX ADMIN — SODIUM CHLORIDE 75 MILLILITER(S): 9 INJECTION INTRAMUSCULAR; INTRAVENOUS; SUBCUTANEOUS at 00:24

## 2018-01-01 RX ADMIN — Medication 5 MILLIGRAM(S): at 21:05

## 2018-01-01 RX ADMIN — FENTANYL CITRATE 25 MICROGRAM(S): 50 INJECTION INTRAVENOUS at 21:21

## 2018-01-01 RX ADMIN — Medication 400 MILLIGRAM(S): at 09:40

## 2018-01-01 RX ADMIN — SODIUM CHLORIDE 75 MILLILITER(S): 9 INJECTION, SOLUTION INTRAVENOUS at 16:31

## 2018-01-01 NOTE — CHART NOTE - NSCHARTNOTEFT_GEN_A_CORE
troponin returned at 2.77, febrile 100.5 axillary    continue IVF hydration, all cultures negative    Ofirmev for fever, patient not taking PO    will transfer to tele, trend enzymes q4-6 hours with EKG    will hold ASA at this time in the setting of worsening anemia    Cardiology eval pending    Palliative eval pending    Patient is DNR/DNI

## 2018-01-01 NOTE — PROGRESS NOTE ADULT - SUBJECTIVE AND OBJECTIVE BOX
PostOp. Pt seen and examined. Pain controlled.   All vital signs stable (as per nursing flowsheet)  Gen: NAD  LLE:  Dressing clean D&I  +sensation to stimuli L2-S1  +dorsiflexion/plantarflexion of ankle/hallux  +dorsalis pedis pulse  Soft compartments, - calf tenderness

## 2018-01-01 NOTE — PROGRESS NOTE ADULT - SUBJECTIVE AND OBJECTIVE BOX
Patient seen and examined. difficult exam 2/2 dementia.                          6.6    8.6   )-----------( 159      ( 01 Jan 2018 05:38 )             20.2     01 Jan 2018 05:38    142    |  109    |  26     ----------------------------<  75     4.0     |  26     |  0.44     Ca    7.9        01 Jan 2018 05:38      PT/INR - ( 01 Jan 2018 05:38 )   PT: 9.5 sec;   INR: 0.88 ratio         PTT - ( 01 Jan 2018 05:38 )  PTT:25.7 sec  Vital Signs Last 24 Hrs  T(C): 36.9 (12-31-17 @ 21:21), Max: 36.9 (12-31-17 @ 21:21)  T(F): 98.4 (12-31-17 @ 21:21), Max: 98.4 (12-31-17 @ 21:21)  HR: 80 (12-31-17 @ 21:21) (77 - 80)  BP: 112/72 (12-31-17 @ 21:21) (112/72 - 136/59)  BP(mean): --  RR: 16 (12-31-17 @ 21:21) (16 - 16)  SpO2: 95% (12-31-17 @ 21:21) (95% - 96%)    Physical Exam  Gen: NAD  difficult exam 2/2 dementia and uncooperative  Dp/pt pulse intact  No calf ttp  Compartments soft

## 2018-01-01 NOTE — PROGRESS NOTE ADULT - PROBLEM SELECTOR PLAN 1
-sepsis rules out  -all cultures negative  -suspect possible viral syndrome vs sx secondary to pain  -dc Zosyn  -c/w IVF hydration

## 2018-01-01 NOTE — BRIEF OPERATIVE NOTE - OPERATION/FINDINGS
closed comminuted fracture of the intertrochanteric region of the left femur requiring intramedullary nailing with a short 130 degree nail and a 90mm head screw

## 2018-01-01 NOTE — BRIEF OPERATIVE NOTE - PROCEDURE
<<-----Click on this checkbox to enter Procedure Antegrade intramedullary nailing of femur  01/01/2018    Active  [id number]

## 2018-01-01 NOTE — PROGRESS NOTE ADULT - SUBJECTIVE AND OBJECTIVE BOX
HPI:  89 female with past medical history  HTN/HLD/Dementia/COPD admitted on  for evaluation of rapid heart rate while at assisted living; upon admission found to be hypotensive, with elevated lactate and wbc; history per medical record as patient unable to provide history. She is saying she is cold. Was also febrile upon admission.  Today  patient sleepy  Today  patient intermittently agitated, has intermittent fever    MEDICATIONS  (STANDING):  cyanocobalamin 1000 MICROGram(s) Oral daily  gabapentin 100 milliGRAM(s) Oral two times a day  lactated ringers. 1000 milliLiter(s) (75 mL/Hr) IV Continuous <Continuous>  latanoprost 0.005% Ophthalmic Solution 1 Drop(s) Both EYES at bedtime  pantoprazole  Injectable 20 milliGRAM(s) IV Push daily  QUEtiapine 25 milliGRAM(s) Oral at bedtime  simvastatin 20 milliGRAM(s) Oral at bedtime    MEDICATIONS  (PRN):  ALBUTerol/ipratropium for Nebulization 3 milliLiter(s) Nebulizer every 4 hours PRN Shortness of Breath and/or Wheezing      Vital Signs Last 24 Hrs  T(C): 38.1 (2018 09:15), Max: 38.1 (2018 09:15)  T(F): 100.5 (2018 09:15), Max: 100.5 (2018 09:15)  HR: 84 (2018 09:15) (77 - 84)  BP: 131/57 (2018 09:15) (112/72 - 136/59)  BP(mean): --  RR: 18 (2018 09:15) (16 - 18)  SpO2: 89% (2018 09:15) (89% - 96%)    Physical Exam:              Constitutional: frail looking  HEENT: NC/AT  Neck: supple  Respiratory: clear, no r/r/w  Cardiovascular: S1S2 regular, no murmurs  Abdomen: soft, not tender, not distended, positive BS  Genitourinary: deferred  Rectal: deferred  Musculoskeletal: mild peripheral edema  Neurological: moving all extremities, no focal deficits  Skin: no rashes        Labs:                        8.8    12.8  )-----------( 240      ( 30 Dec 2017 05:50 )             27.8     12-30    140  |  106  |  38<H>  ----------------------------<  138<H>  4.9   |  28  |  1.18    Ca    8.3<L>      30 Dec 2017 05:50  Phos  4.9     12-30  Mg     2.2     12-30    TPro  6.2  /  Alb  3.1<L>  /  TBili  0.4  /  DBili  x   /  AST  34  /  ALT  19  /  AlkPhos  81             Cultures:       Culture - Blood (collected 17 @ 01:12)  Source: .Blood None  Preliminary Report (17 @ 08:01):    No growth to date.    Culture - Blood (collected 17 @ 00:09)  Source: .Blood Blood  Preliminary Report (17 @ 08:01):    No growth to date.                              8.8    12.8  )-----------( 240      ( 30 Dec 2017 05:50 )             27.8     12-30    140  |  106  |  38<H>  ----------------------------<  138<H>  4.9   |  28  |  1.18    Ca    8.3<L>      30 Dec 2017 05:50  Phos  4.9     12-30  Mg     2.2     30    TPro  6.2  /  Alb  3.1<L>  /  TBili  0.4  /  DBili  x   /  AST  34  /  ALT  19  /  AlkPhos  81       LIVER FUNCTIONS - ( 30 Dec 2017 05:50 )  Alb: 3.1 g/dL / Pro: 6.2 gm/dL / ALK PHOS: 81 U/L / ALT: 19 U/L / AST: 34 U/L / GGT: x           Urinalysis Basic - ( 29 Dec 2017 20:48 )    Color: Yellow / Appearance: Clear / S.015 / pH: x  Gluc: x / Ketone: Negative  / Bili: Negative / Urobili: 1 mg/dL   Blood: x / Protein: 15 mg/dL / Nitrite: Negative   Leuk Esterase: Negative / RBC: 0-2 /HPF / WBC 0-2   Sq Epi: x / Non Sq Epi: Few / Bacteria: Few          Radiology:< from: CT Chest No Cont (17 @ 04:26) >    EXAM:  CT CHEST                            PROCEDURE DATE:  2017          INTERPRETATION:  EXAM: CT CHEST WITHOUT CONTRAST    CLINICAL INFORMATION: Sepsis.  Rule out pneumonia.    TECHNIQUE: CT scan of the chest, without intravenous contrast,was   performed. Two dimensional and three-dimensional maximum intensity   projection reformats were generated.        COMPARISON STUDY:None.    FINDINGS:     Lungs: Grossly clear.  Pleura: No pleural effusion or pneumothorax.  Airways: Central airways are clear.    Heart: Heart size is unremarkable.  Heavy at this chronic calcification   of coronary arteries.  Moderate calcification of the aortic root.  Mediastinum/Munira: No lymphadenopathy.  Vasculature: No thoracic aortic aneurysm.  Moderate atherosclerotic   calcification of the visualized aorta and branch vessels.        Chest wall/lower neck: Unremarkable.    Bones: Severe thoracic kyphosis with mild to moderate compression   fractures at T4, T5, T6, T7, T8, T9 and T12.  There appears alejandro   fracture lucency in the T5 vertebral body extending into the right   pedicle which could be acute or subacute.  The T5 and T6 vertebral bodies   have a mottled appearance and pathologic fracture cannot be excluded.    Moderate to large compression fracture at L1 L1 containing   methylmethacrylate from prior vertebroplasty/kyphoplasty.  Severe   arthritic changes in both glenohumeral joints.    Visualized upper abdomen: Approximately 3 cm left renal cyst 5 mm   hypodense right renal lesion compatible with a hemorrhagic cyst.    Fullness versus mild hydronephrosis in the partially visualized right   renal collecting system.      IMPRESSION:    No gross CT evidence of pneumonia or congestive heart failure.  No focal   infiltrate, pleural effusion or pneumothorax.    Severe thoracic kyphosis with mild to moderate compression fractures at   T4-T9 and at T12.  Moderate to large compression fracture at L1   containing methylmethacrylate from prior vertebroplasty/kyphoplasty.    There appears to be fracture lucency in the T5 vertebral body extending   into the right pedicle which could be acute or subacute.  The T5 and T6   vertebral bodies have a mottled appearance of pathologic fracture cannot   be excluded.  Bone scan or MRI can be performed as clinically warranted.      < end of copied text >  < from: Xray Hip w/ Pelvis 2 or 3 Views, Left (17 @ 19:09) >  mpression:    Acute comminuted left intertrochanteric femoral fracture.      < end of copied text >      Advanced directive addressed: full resuscitation

## 2018-01-01 NOTE — PROGRESS NOTE ADULT - SUBJECTIVE AND OBJECTIVE BOX
89F sent from [facility] for weakness and dehydration, found to have FTT and left hip fracture.     18: Interval events: patient comfortable overnight, worsening anemia.     MEDICATIONS  (STANDING):  cyanocobalamin 1000 MICROGram(s) Oral daily  gabapentin 100 milliGRAM(s) Oral two times a day  latanoprost 0.005% Ophthalmic Solution 1 Drop(s) Both EYES at bedtime  pantoprazole  Injectable 20 milliGRAM(s) IV Push daily  piperacillin/tazobactam IVPB. 3.375 Gram(s) IV Intermittent every 12 hours  QUEtiapine 25 milliGRAM(s) Oral at bedtime  simvastatin 20 milliGRAM(s) Oral at bedtime  sodium chloride 0.9%. 1000 milliLiter(s) (75 mL/Hr) IV Continuous <Continuous>    MEDICATIONS  (PRN):  ALBUTerol/ipratropium for Nebulization 3 milliLiter(s) Nebulizer every 4 hours PRN Shortness of Breath and/or Wheezing      Allergies    No Known Allergies    Intolerances        REVIEW OF SYSTEMS: agitated, dementia        Vital Signs Last 24 Hrs  T(C): 36.8 (31 Dec 2017 13:18), Max: 37.4 (31 Dec 2017 05:38)  T(F): 98.3 (31 Dec 2017 13:18), Max: 99.4 (31 Dec 2017 05:38)  HR: 77 (31 Dec 2017 13:18) (77 - 86)  BP: 136/59 (31 Dec 2017 13:18) (102/69 - 152/35)  BP(mean): --  RR: 16 (31 Dec 2017 13:18) (16 - 17)  SpO2: 96% (31 Dec 2017 13:18) (92% - 96%)    PHYSICAL EXAMINATION:  SKIN: no rashes  HEAD: NC/AT  EYES: PERRLA, EOMI  EARS: TM's intact  NOSE: no abnormalities  NECK:  Supple. No lymphadenopathy. Jugular venous pressure not elevated. Carotids equal.   HEART:   S1S2 tachy  CHEST:  bilat ronchi  ABDOMEN:  Soft and nontender.   EXTREMITIES:  left hip externally rotated with hematoma hip and upper thigh  NEURO: demented, AAOx0      Vitals/LABS:    T(C): 36.9 (17 @ 21:21), Max: 36.9 (17 @ 21:21)  HR: 80 (17 @ 21:21) (77 - 80)  BP: 112/72 (17 @ 21:21) (112/72 - 136/59)  RR: 16 (17 @ 21:21) (16 - 16)  SpO2: 95% (17 @ 21:21) (95% - 96%)  Wt(kg): --                              6.6    8.6   )-----------( 159      ( 2018 05:38 )             20.2     2018 05:38    142    |  109    |  26     ----------------------------<  75     4.0     |  26     |  0.44     Ca    7.9        2018 05:38      PT/INR - ( 2018 05:38 )   PT: 9.5 sec;   INR: 0.88 ratio         PTT - ( 2018 05:38 )  PTT:25.7 sec  CAPILLARY BLOOD GLUCOSE    Bl Cx (-) x2, UCx (-)    Urinalysis Basic - ( 29 Dec 2017 20:48 )    Color: Yellow / Appearance: Clear / S.015 / pH: x  Gluc: x / Ketone: Negative  / Bili: Negative / Urobili: 1 mg/dL   Blood: x / Protein: 15 mg/dL / Nitrite: Negative   Leuk Esterase: Negative / RBC: 0-2 /HPF / WBC 0-2   Sq Epi: x / Non Sq Epi: Few / Bacteria: Few

## 2018-01-01 NOTE — CHART NOTE - NSCHARTNOTEFT_GEN_A_CORE
EKG obtained, ST depressions noted in the lateral leads.     Patient with no complaints of CP.    Cardiology Cx placed, Dr. Harrison. Spoke with him personally. He will see patient today and evaluate prior to surgery.    Cardiac enzymes added to AM labs, will follow.      ***Medical Clearance amended:  -pt has moderate perioperative risk for cardiac complications due to advanced age and is medically optimized for an intermediate risk procedure pending stool occult blood and Cardiology eval pror to sx. Please see progress note from 1/1/18 for additional recommendations.

## 2018-01-01 NOTE — CONSULT NOTE ADULT - SUBJECTIVE AND OBJECTIVE BOX
Patient is a 89y old  Female who presents with a chief complaint of sent from [facility] for tachycardia with suspicion for dehydration (30 Dec 2017 03:57)      HPI:  89 Fhx HTN/HLD/Dementia/COPD presents to ED sent by [facility] for evaluation of sinus tachycardia 110 and to   r/o dehydration. ,. In Ed  on arrival per ED physician patient was found to be  thin and frail appearing complaining of no pain.had fever 100.7 in ED  In ED patient was hypotensive  and tachycardic 110, BP stabilised s/p IVF 2 L bolus and broad spectrum IV abx    CXR- rotated film cannot exclude right sided PNA   EKG for 12/29/17- not available for review in muse EKG on in ED chart  WBC 17, elevated lactate 2.8 >>2.9  received 2 liters NS IVF bolus and  IV cefepime and vanco IV in ED  currently vitals stable   patient is a poor historian due to hx of dementia .she is sleeping ,but easily arousable with verbal stimuli, oriented to self, place -hospital  she is able to give minimal hx- denies any CP, SOB or abdominal pain or nausea or vomiting   most of the hx was derived from charts and [facility] papers  I've been asked to evaluate patient for preop clearnce she was found to have severe anemia , ST depressions laterally on EKG , and pos trop     pmhx/pshx- dementia/HTN/HLD/COPD/Pulmonary nodules,vitamin b12 deficiency, GERD, hx of pelvic , hx of appendectomy, tonsillectomy,vaginal repair   social- lives in [facility]  family hx- non contributory    . (30 Dec 2017 03:57)      PAST MEDICAL & SURGICAL HISTORY:  Dementia  GERD (gastroesophageal reflux disease)  HLD (hyperlipidemia)  HTN (hypertension)  No significant past surgical history                                    MEDICATIONS  (STANDING):  cyanocobalamin 1000 MICROGram(s) Oral daily  gabapentin 100 milliGRAM(s) Oral two times a day  lactated ringers. 1000 milliLiter(s) (75 mL/Hr) IV Continuous <Continuous>  latanoprost 0.005% Ophthalmic Solution 1 Drop(s) Both EYES at bedtime  pantoprazole  Injectable 20 milliGRAM(s) IV Push daily  QUEtiapine 25 milliGRAM(s) Oral at bedtime  simvastatin 20 milliGRAM(s) Oral at bedtime    MEDICATIONS  (PRN):  ALBUTerol/ipratropium for Nebulization 3 milliLiter(s) Nebulizer every 4 hours PRN Shortness of Breath and/or Wheezing      FAMILY HISTORY:  cannot pbtain     SOCIAL HISTORY:  cannot obtain  CIGARETTES:        Vital Signs Last 24 Hrs  T(C): 37.4 (01 Jan 2018 09:52), Max: 38.1 (01 Jan 2018 09:15)  T(F): 99.4 (01 Jan 2018 09:52), Max: 100.5 (01 Jan 2018 09:15)  HR: 79 (01 Jan 2018 09:52) (77 - 84)  BP: 133/53 (01 Jan 2018 09:52) (112/72 - 136/59)  BP(mean): --  RR: 16 (01 Jan 2018 09:52) (16 - 18)  SpO2: 90% (01 Jan 2018 09:52) (89% - 96%)            INTERPRETATION OF TELEMETRY:  ST   ECG: ST with PRWP ST depressions laterally nondynamic    I&O's Detail    31 Dec 2017 07:01  -  01 Jan 2018 07:00  --------------------------------------------------------  IN:    sodium chloride 0.9%: 1095 mL  Total IN: 1095 mL    OUT:    Indwelling Catheter - Urethral: 1075 mL  Total OUT: 1075 mL    Total NET: 20 mL          LABS:                        6.6    8.6   )-----------( 159      ( 01 Jan 2018 05:38 )             20.2     01-01    142  |  109<H>  |  26<H>  ----------------------------<  75  4.0   |  26  |  0.44<L>    Ca    7.9<L>      01 Jan 2018 05:38      CARDIAC MARKERS ( 01 Jan 2018 05:38 )  2.770 ng/mL / x     / x     / x     / x          PT/INR - ( 01 Jan 2018 05:38 )   PT: 9.5 sec;   INR: 0.88 ratio         PTT - ( 01 Jan 2018 05:38 )  PTT:25.7 sec    I&O's Summary    31 Dec 2017 07:01  -  01 Jan 2018 07:00  --------------------------------------------------------  IN: 1095 mL / OUT: 1075 mL / NET: 20 mL      BNP  RADIOLOGY & ADDITIONAL STUDIES:

## 2018-01-02 DIAGNOSIS — I21.4 NON-ST ELEVATION (NSTEMI) MYOCARDIAL INFARCTION: ICD-10-CM

## 2018-01-02 LAB
ANION GAP SERPL CALC-SCNC: 13 MMOL/L — SIGNIFICANT CHANGE UP (ref 5–17)
BUN SERPL-MCNC: 22 MG/DL — SIGNIFICANT CHANGE UP (ref 7–23)
CALCIUM SERPL-MCNC: 7.8 MG/DL — LOW (ref 8.5–10.1)
CHLORIDE SERPL-SCNC: 105 MMOL/L — SIGNIFICANT CHANGE UP (ref 96–108)
CO2 SERPL-SCNC: 22 MMOL/L — SIGNIFICANT CHANGE UP (ref 22–31)
CREAT SERPL-MCNC: 0.43 MG/DL — LOW (ref 0.5–1.3)
GLUCOSE SERPL-MCNC: 63 MG/DL — LOW (ref 70–99)
POTASSIUM SERPL-MCNC: 3.4 MMOL/L — LOW (ref 3.5–5.3)
POTASSIUM SERPL-SCNC: 3.4 MMOL/L — LOW (ref 3.5–5.3)
SODIUM SERPL-SCNC: 140 MMOL/L — SIGNIFICANT CHANGE UP (ref 135–145)

## 2018-01-02 PROCEDURE — 99223 1ST HOSP IP/OBS HIGH 75: CPT

## 2018-01-02 RX ADMIN — OXYCODONE HYDROCHLORIDE 10 MILLIGRAM(S): 5 TABLET ORAL at 12:14

## 2018-01-02 RX ADMIN — QUETIAPINE FUMARATE 25 MILLIGRAM(S): 200 TABLET, FILM COATED ORAL at 21:09

## 2018-01-02 RX ADMIN — ENOXAPARIN SODIUM 40 MILLIGRAM(S): 100 INJECTION SUBCUTANEOUS at 18:13

## 2018-01-02 RX ADMIN — HYDROMORPHONE HYDROCHLORIDE 0.5 MILLIGRAM(S): 2 INJECTION INTRAMUSCULAR; INTRAVENOUS; SUBCUTANEOUS at 01:16

## 2018-01-02 RX ADMIN — HYDROMORPHONE HYDROCHLORIDE 0.5 MILLIGRAM(S): 2 INJECTION INTRAMUSCULAR; INTRAVENOUS; SUBCUTANEOUS at 00:02

## 2018-01-02 RX ADMIN — Medication 100 MILLIGRAM(S): at 06:30

## 2018-01-02 RX ADMIN — GABAPENTIN 100 MILLIGRAM(S): 400 CAPSULE ORAL at 06:37

## 2018-01-02 RX ADMIN — HYDROMORPHONE HYDROCHLORIDE 0.5 MILLIGRAM(S): 2 INJECTION INTRAMUSCULAR; INTRAVENOUS; SUBCUTANEOUS at 07:37

## 2018-01-02 RX ADMIN — Medication 500 MILLIGRAM(S): at 06:37

## 2018-01-02 RX ADMIN — LATANOPROST 1 DROP(S): 0.05 SOLUTION/ DROPS OPHTHALMIC; TOPICAL at 21:08

## 2018-01-02 RX ADMIN — OXYCODONE HYDROCHLORIDE 10 MILLIGRAM(S): 5 TABLET ORAL at 15:53

## 2018-01-02 RX ADMIN — HYDROMORPHONE HYDROCHLORIDE 0.5 MILLIGRAM(S): 2 INJECTION INTRAMUSCULAR; INTRAVENOUS; SUBCUTANEOUS at 06:48

## 2018-01-02 RX ADMIN — Medication 100 MILLIGRAM(S): at 13:39

## 2018-01-02 RX ADMIN — PANTOPRAZOLE SODIUM 20 MILLIGRAM(S): 20 TABLET, DELAYED RELEASE ORAL at 12:25

## 2018-01-02 RX ADMIN — SIMVASTATIN 20 MILLIGRAM(S): 20 TABLET, FILM COATED ORAL at 21:22

## 2018-01-02 RX ADMIN — SODIUM CHLORIDE 50 MILLILITER(S): 9 INJECTION, SOLUTION INTRAVENOUS at 00:50

## 2018-01-02 NOTE — CONSULT NOTE ADULT - ASSESSMENT
89 female with past medical history  HTN/HLD/Dementia/COPD admitted on 12/29 for evaluation of rapid heart rate while at assisted living; upon admission found to be hypotensive, with elevated lactate and wbc; history per medical record as patient unable to provide history. She is saying she is cold. Was also febrile upon admission.  1. Patient admitted with possible early sepsis of unclear etiology  - panculture in progress  - iv hydration and supportive care   -  serial cbc and monitor temperature   - agree with zosyn as ordered pending cultures, doubt pneumonia, possible urinary origin?, hard to ascertain given patient unable to provide history  - will provide short course of antibiotics should cultures be unrevealing  2. other issues; HTN/HLD/Dementia/COPD   - per medicine
89 yo woman with advanced dementia and severe protein-calorie malnutrition, admitted from assisted living facility on  - w/u= anemia, +MI, +fracture left femur. Underwent ORIF last PM and appears comfortable and with stable VS.     1)Fracture left hip  -No H/O fall   -Underwent ORIF last PM  -Appears comfortable   -Needs PT evaluation  -Will need ROLAND after discharge - will not be ready to return to assisted living at this time     2)Malnutrition  -BMI<9  -Appreciate dietician evaluation and recommendations  -Severe protein-calorie malnutrition  -Needs assistance with meals-Unfortunately, anorexia is a common problem in advanced dementia and this may be the cause of this malnutrition (though family states she has always been thin)    3)+MI  -+troponins, EKG changes    -Appreciate cardiology consult  -No chest pain    4) Anemia  -transfused 2 u PRBC's  -likely secondary to bleeding ~ fracture  -H/H stable now    5)Fever on admission  -Now normothermic  -No evidence of infection  -No antibiotics at this time   -Appreciate ID consult    6)Advance Directives  -Has HCP form on chart  -HCP #1=Eliseo Israel - he is  x 2 yrs  -HCP #2= tatyana Sofiaaacson = niece- she lives in [place] - her phone= &[phone]  -Pt has out of hosp DNR signed by Dr Sanchez    7) Discharge plans  -Will need ROLAND (if she can participate) before returning to assisted living facility  -I spoke with Tatyana López (and her daughter) by phone - she will be available by phone to discuss "next steps"   -Discussed with bedside RN and  on unit  -Pt went to [facility] for ROLAND last year
89 female with past medical history  HTN/HLD/Dementia/COPD admitted on 12/29 for evaluation of rapid heart rate while at assisted living; upon admission found to be hypotensive, with elevated lactate and wbc; history per medical record as patient unable to provide history. She is saying she is cold. Was also febrile upon admission.  Rule out sepsis.      CT of chest (images of upper abdomen) reveal 3 cm left renal cyst, 5 mm right renal lesion (hemorrhagic or dense cyst), mild hydro right (no IV contrast utilized).      No intervention of followup imaging required in this 89 yo female
A/P: 89y Female with L hip fracture    Pain control  NWB L LE, bedrest  fu xr femur  imaging reviewed  med admit  Medical clearance/optimization for OR, plan for 1/1/18 w/ dr simon  attending aware and agrees with above  npo except meds after midnight  iv fluids while npo  hold chemical anticoagulation after midnight
This is a 89 year ld female with fx of her left hip now s/p left hip IMN  on 1-1-18.  Pt has high thrombosis risk and requires anticoagulation prophylaxis.    Plan:  :Lovenox 40mg sq daily for four weeks post procedure  :daily cbc/bmp  :LE Venodynes  : increase mobility as tolerated  :Thanks for consult will f/u
89 Fhx HTN/HLD/Dementia/COPD presents to ED sent by [facility] for evaluation of sinus tachycardia 110 and to   r/o dehydration.  She was found to ryne anemic , have new EKG changes and pos trop , PAtient is confused but denies CP .   1) transfuse 2 units PRBCs as pos top and STT changes are likely related to demand ischmeia from anemia  2) Sinus tach most likely secondary to above , will start Lopressor for pos trop   3) murmur on exam sound like only moderate AS given preserved S2   4) Given advanced age , pos Ced and ekg changes patient is high risk but optimized for surgery from a cardiac standpoint   5) DVT prohylaxis

## 2018-01-02 NOTE — CONSULT NOTE ADULT - SUBJECTIVE AND OBJECTIVE BOX
HPI:  89 Fhx HTN/HLD/Dementia/COPD presents to ED sent by [facility] for evaluation of sinus tachycardia 110 and to   r/o dehydration. ,. In Ed  on arrival per ED physician patient was found to be  thin and frail appearing complaining of no pain.had fever 100.7 in ED  In ED patient was hypotensive  and tachycardic 110, BP stabilised s/p IVF 2 L bolus and broad spectrum IV abx    CXR- rotated film cannot exclude right sided PNA   EKG for 12/29/17- not available for review in muse EKG on in ED chart  WBC 17, elevated lactate 2.8 >>2.9  received 2 liters NS IVF bolus and  IV cefepime and vanco IV in ED  currently vitals stable   patient is a poor historian due to hx of dementia .she is sleeping ,but easily arousable with verbal stimuli, oriented to self, place -hospital  she is able to give minimal hx- denies any CP, SOB or abdominal pain or nausea or vomiting   most of the hx was derived from charts and [facility] papers    pmhx/pshx- dementia/HTN/HLD/COPD/Pulmonary nodules,vitamin b12 deficiency, GERD, hx of pelvic , hx of appendectomy, tonsillectomy,vaginal repair   social- lives in [facility]  family hx- non contributory    . (30 Dec 2017 03:57)      Patient is a 89y old  Female who presents with a chief complaint of sent from [facility] for tachycardia with suspicion for dehydration (30 Dec 2017 03:57)  pt found to have Acute comminuted left intertrochanteric femoral fracture on 12-31-18, now s/p left hip im nail on 1-1-18.      Consulted by            for VTE prophylaxis, risk stratification, and anticoagulation management.    PAST MEDICAL & SURGICAL HISTORY:  Dementia  GERD (gastroesophageal reflux disease)  HLD (hyperlipidemia)  HTN (hypertension)  No significant past surgical history    Caprini VTE Risk Score: 9  IMPROVE Bleeding Risk Score: 3.5    Falls Risk:   High (  )  Mod (  )  Low (  )  crcl: 44.46  EBL:  25 ml  1-2-118 Pt seen at bedside.  Pt knows her name and that she is in [facility].  [place] is [place].  Informed her of her Lovenox inj but she is not able to comprehend.  Will reinforce as needed.  Vital Signs Last 24 Hrs  T(C): 36.5 (02 Jan 2018 10:23), Max: 37.2 (01 Jan 2018 16:15)  T(F): 97.7 (02 Jan 2018 10:23), Max: 99 (01 Jan 2018 16:15)  HR: 91 (02 Jan 2018 10:23) (67 - 91)  BP: 122/60 (02 Jan 2018 10:23) (122/60 - 185/105)  BP(mean): --  RR: 15 (02 Jan 2018 10:23) (10 - 18)  SpO2: 96% (02 Jan 2018 10:23) (93% - 99%)  FAMILY HISTORY:    Denies any personal or familial history of clotting or bleeding disorders.    Allergies    No Known Allergies    Intolerances        REVIEW OF SYSTEMS    (  )Fever	     (  )Constipation	(  )SOB				(  )Headache	(  )Dysuria  (  )Chills	     (  )Melena	(  )Dyspnea present on exertion	                    (  )Dizziness                    (  )Polyuria  (  )Nausea	     (  )Hematochezia	(  )Cough			                    (  )Syncope   	(  )Hematuria  (  )Vomiting    (  )Chest Pain	(  )Wheezing			(  )Weakness  (  )Diarrhea     (  )Palpitations	(  )Anorexia			(  )Myalgia    All other review of systems negative: Yes      PHYSICAL EXAM:    Constitutional: Appears Well    Neurological: A& O x 2 person and place    Skin: Warm    Respiratory and Thorax: normal effort; Breath sounds: normal; No rales/wheezing/rhonchi  	  Cardiovascular: S1, S2, regular, NMBR	    Gastrointestinal: BS + x 4Q, nontender	    Genitourinary:  Bladder nondistended, nontender    Musculoskeletal:   General Right:   no muscle/joint tenderness,   normal tone, no joint swelling,   ROM: limited/full	    General Left:   no muscle/joint tenderness,   normal tone, no joint swelling,   ROM: limited/full    Hip:  Left: Dressing CDI;    Lower extrems:   Right: no calf tenderness              negative nati's sign               + pedal pulses    Left:   no calf tenderness              negative nati's sign               + pedal pulses                          11.0   10.7  )-----------( 200      ( 02 Jan 2018 06:42 )             32.8       01-02    140  |  105  |  22  ----------------------------<  63<L>  3.4<L>   |  22  |  0.43<L>    Ca    7.8<L>      02 Jan 2018 06:42        PT/INR - ( 01 Jan 2018 05:38 )   PT: 9.5 sec;   INR: 0.88 ratio         PTT - ( 01 Jan 2018 05:38 )  PTT:25.7 sec				    MEDICATIONS  (STANDING):  ascorbic acid 500 milliGRAM(s) Oral two times a day  cyanocobalamin 1000 MICROGram(s) Oral daily  docusate sodium 100 milliGRAM(s) Oral three times a day  enoxaparin Injectable 40 milliGRAM(s) SubCutaneous every 24 hours  folic acid 1 milliGRAM(s) Oral daily  gabapentin 100 milliGRAM(s) Oral two times a day  lactated ringers. 1000 milliLiter(s) IV Continuous <Continuous>  latanoprost 0.005% Ophthalmic Solution 1 Drop(s) Both EYES at bedtime  multivitamin 1 Tablet(s) Oral daily  pantoprazole  Injectable 20 milliGRAM(s) IV Push daily  QUEtiapine 25 milliGRAM(s) Oral at bedtime  simvastatin 20 milliGRAM(s) Oral at bedtime          DVT Prophylaxis:  LMWH                   (x  )  Heparin SQ           (  )  Coumadin             (  )  Xarelto                  (  )  Eliquis                   (  )  Venodynes           (x  )  Ambulation          ( x )  UFH                       (  )  Contraindicated  (  )

## 2018-01-02 NOTE — CONSULT NOTE ADULT - CONSULT REASON
"failure to thrive"
5 mm hemorrhagic right renal cyst
left hip pain
preop clearance
s/p left hip fx, anticoagulation management.
tachycardia

## 2018-01-02 NOTE — PHYSICAL THERAPY INITIAL EVALUATION ADULT - CRITERIA FOR SKILLED THERAPEUTIC INTERVENTIONS
predicted duration of therapy intervention/risk reduction/prevention/anticipated discharge recommendation/impairments found/rehab potential/anticipated equipment needs at discharge

## 2018-01-02 NOTE — CONSULT NOTE ADULT - SUBJECTIVE AND OBJECTIVE BOX
HPI: 89 yo woman with advanced dementia, admitted  from [facility] with "tachycardia" - w/u = tachycardia, hypotension, T>99, anemia. Work-up neg for infection - was transfused PRBC's. LLE noted to be externally rotated and shortened - Xray=fracture left femur. Had EKG changes and + troponins. Underwent ORIF last PM.   We were asked to see for "failure to thrive" - severe protein-calorie malnutrition, advanced dementia.   I examined pt this AM - no family at bedside. Pt awake and alert - denies pain or SOB. C/O feeling chilly when uncovered.       PAIN: denies  Location  Intensity  Quality  Aggravating Factors  Alleviating Factors  Timing    DYSPNEA: denies    PAST MEDICAL & SURGICAL HISTORY:  Dementia - advanced  GERD (gastroesophageal reflux disease)  HLD (hyperlipidemia)  HTN (hypertension)  No significant past surgical history      SOCIAL HX: has been at [facility] x "years" as per niece Tatyana López - this woman is the only surviving HCP. Eliseo Israel is #1 HCP as per papers from [facility] but he is . Ms López lives in [place] and has her own health issues, cannot visit. Phone [phone]. As per niece pt can be stubborn and uncooperative BUT she does respond to compliments about her appearance and will generally cooperate if complimented.    FAMILY HISTORY:       ROS:    Anxiety  Depression  Physical Discomfort -feels chilly if uncovered  Dyspnea- denies  Constipation  Diarrhea  Nausea  Vomiting  Anorexia  Cough  Secretions  Fatigue  Weakness    Difficult to obtain thorough ROS given pt's dementia      PHYSICAL EXAM:    Vital Signs Last 24 Hrs  T(C): 36.5 (2018 10:23), Max: 37.2 (2018 16:15)  T(F): 97.7 (2018 10:23), Max: 99 (2018 16:15)  HR: 91 (2018 10:23) (67 - 91)  BP: 122/60 (2018 10:23) (122/60 - 185/105)  BP(mean): --  RR: 15 (2018 10:23) (10 - 18)  SpO2: 96% (2018 10:23) (93% - 99%)  Daily     Daily     General: Awake, alert - appears comfortable. Very thin   HEENT: mucosa moist  Lungs: clear anteriorly  Cardiac: RRR  GI: +BS. Soft, no masses or tenderness  :  Ext: No edema. Ecchymosis ~ left hip incision. No erythema or wound drainage   Neuro: No gross focal deficit      LABS:                        11.0   10.7  )-----------( 200      ( 2018 06:42 )             32.8         140  |  105  |  22  ----------------------------<  63<L>  3.4<L>   |  22  |  0.43<L>    Ca    7.8<L>      2018 06:42      PT/INR - ( 2018 05:38 )   PT: 9.5 sec;   INR: 0.88 ratio         PTT - ( 2018 05:38 )  PTT:25.7 sec  Albumin: Albumin, Serum: 3.1 g/dL (12-30 @ 05:50)      Allergies    No Known Allergies    Intolerances      MEDICATIONS  (STANDING):  ascorbic acid 500 milliGRAM(s) Oral two times a day  ceFAZolin   IVPB 2000 milliGRAM(s) IV Intermittent every 8 hours  cyanocobalamin 1000 MICROGram(s) Oral daily  docusate sodium 100 milliGRAM(s) Oral three times a day  enoxaparin Injectable 40 milliGRAM(s) SubCutaneous every 24 hours  folic acid 1 milliGRAM(s) Oral daily  gabapentin 100 milliGRAM(s) Oral two times a day  lactated ringers. 1000 milliLiter(s) (50 mL/Hr) IV Continuous <Continuous>  latanoprost 0.005% Ophthalmic Solution 1 Drop(s) Both EYES at bedtime  multivitamin 1 Tablet(s) Oral daily  pantoprazole  Injectable 20 milliGRAM(s) IV Push daily  QUEtiapine 25 milliGRAM(s) Oral at bedtime  simvastatin 20 milliGRAM(s) Oral at bedtime    MEDICATIONS  (PRN):  acetaminophen   Tablet 650 milliGRAM(s) Oral every 6 hours PRN For Temp greater than 38 C (100.4 F)  benzocaine 15 mG/menthol 3.6 mG Lozenge 1 Lozenge Oral every 3 hours PRN Sore Throat  diphenhydrAMINE   Capsule 25 milliGRAM(s) Oral at bedtime PRN Insomnia  hydrALAZINE Injectable 5 milliGRAM(s) IV Push every 5 minutes PRN sbp>160  HYDROmorphone  Injectable 0.5 milliGRAM(s) IV Push every 4 hours PRN Severe Pain (7 - 10)  ondansetron Injectable 4 milliGRAM(s) IV Push every 6 hours PRN Nausea and/or Vomiting  oxyCODONE    IR 10 milliGRAM(s) Oral every 4 hours PRN Moderate Pain (4 - 6)      RADIOLOGY:  < from: Xray Femur 2 Views, Left (12.31.17 @ 21:00) >  Impression:    Acute comminuted left intertrochanteric femoral fracture.      < end of copied text >    < from: Xray Wrist 3 Views, Right (12.31.17 @ 23:14) >  Impression:    There is impacted fracture at the distal radial metaphysis which appears   subacute to chronic. There is acquired positive ulnar variance. There is   a chronic fracture at the ulnar styloid. There is severe basilar and   moderate scaphoid trapezial trapezoidal joint arthrosis. Soft tissue   swelling is noted about the distal radius.          < end of copied text >

## 2018-01-02 NOTE — CONSULT NOTE ADULT - CONSULT REQUESTED DATE/TIME
01-Jan-2018 11:00
02-Jan-2018 14:24
30-Dec-2017 10:23
31-Dec-2017
31-Dec-2017 21:09
30-Dec-2017 09:33

## 2018-01-02 NOTE — PHYSICAL THERAPY INITIAL EVALUATION ADULT - GENERAL OBSERVATIONS, REHAB EVAL
pt was found lying in bed with telemetry on, pt appears confused, arouses when called by name , not following commands

## 2018-01-02 NOTE — PHYSICAL THERAPY INITIAL EVALUATION ADULT - PERTINENT HX OF CURRENT PROBLEM, REHAB EVAL
89 yo woman with advanced dementia, admitted 12/29 from [facility] with "tachycardia" 110 HR-, hypotension, T>99, anemia.  was transfused PRBC's. LLE noted to be externally rotated and shortened - Xray=fracture left femur. Had EKG changes and + troponins. Underwent ORIF last PM., WBAT LLE, Apparently RN informed that pt also has Rt wrist fx??swelling and ecchymosis+ but no note about it found, d/w Ortho resident-apparently old fx

## 2018-01-02 NOTE — PHYSICAL THERAPY INITIAL EVALUATION ADULT - LIVES WITH, PROFILE
pt has been at [facility] facility x "years" as per niece Tatyana López - this woman is the only surviving HCP. Eliseo Israel is #1 HCP as per papers from [facility] but he is . Ms López lives in [place] and has her own health issues, cannot visit. Phone [phone]. As per niece pt can be stubborn and uncooperative BUT she does respond to compliments about her appearance and will generally cooperate if complimented.

## 2018-01-02 NOTE — PROGRESS NOTE ADULT - SUBJECTIVE AND OBJECTIVE BOX
Patient is a 89 y/o/f with pmhx of HTN/HLD/Dementia/COPD presents to ED sent by [facility] for evaluation of sinus tachycardia 110. Upon admission, patient was ntoed to have temp.   In ED patient was hypotensive  and tachycardic 110, BP stabilised s/p IVF 2 L bolus and broad spectrum IV abx   CXR- rotated film cannot exclude right sided PNA   EKG for 12/29/17- not available for review in muse EKG on in ED chart  WBC 17, elevated lactate 2.8 >>2.9    Patient is extremely poor historian due to underlying dementia.     REVIEW OF SYSTEMS:  General: NAD, hemodynamically stable, (-)  fever, (-) chills, (-) weakness  HEENT:  Eyes:  No visual loss, blurred vision, double vision or yellow sclerae. Ears, Nose, Throat:  No hearing loss, sneezing, congestion, runny nose or sore throat.  SKIN:  No rash or itching.  CARDIOVASCULAR:  No chest pain, chest pressure or chest discomfort. No palpitations or edema.  RESPIRATORY:  No shortness of breath, cough or sputum.  GASTROINTESTINAL:  No anorexia, nausea, vomiting or diarrhea. No abdominal pain or blood.  NEUROLOGICAL:  No headache, dizziness, syncope, paralysis, ataxia, numbness or tingling in the extremities. No change in bowel or bladder control.  MUSCULOSKELETAL:  No muscle, back pain, joint pain or stiffness.  HEMATOLOGIC:  No anemia, bleeding or bruising.  LYMPHATICS:  No enlarged nodes. No history of splenectomy.  ENDOCRINOLOGIC:  No reports of sweating, cold or heat intolerance. No polyuria or polydipsia.  ALLERGIES:  No history of asthma, hives, eczema or rhinitis.    Physical Exam:   GENERAL APPEARANCE:  NAD, hemodynamically stable  T(C): 36.5 (01-02-18 @ 10:23), Max: 37.2 (01-01-18 @ 16:15)  HR: 91 (01-02-18 @ 10:23) (67 - 91)  BP: 122/60 (01-02-18 @ 10:23) (122/60 - 185/105)  RR: 15 (01-02-18 @ 10:23) (10 - 18)  SpO2: 96% (01-02-18 @ 10:23) (93% - 99%)  Wt(kg): --  HEENT:  Head is normocephalic    Skin:  Warm and dry without any rash   NECK:  Supple without lymphadenopathy.   HEART:  Regular rate and rhythm. normal S1 and S2, No M/R/G  LUNGS:  Good ins/exp effort, no W/R/R/C  ABDOMEN:  Soft, nontender, nondistended with good bowel sounds heard  EXTREMITIES:  Without cyanosis, clubbing or edema.   NEUROLOGICAL:  Gross nonfocal     Labs:   CBC Full  -  ( 02 Jan 2018 06:42 )  WBC Count : 10.7 K/uL  Hemoglobin : 11.0 g/dL  Hematocrit : 32.8 %  Platelet Count - Automated : 200 K/uL    PT/INR - ( 01 Jan 2018 05:38 )   PT: 9.5 sec;   INR: 0.88 ratio         PTT - ( 01 Jan 2018 05:38 )  PTT:25.7 sec    01-02    140  |  105  |  22  ----------------------------<  63<L>  3.4<L>   |  22  |  0.43<L>    Ca    7.8<L>      02 Jan 2018 06:42 Patient is a 89 y/o/f w/ pmhx of HTN / HLD / Dementia / COPD presents to ED sent by [facility] for evaluation of sinus tachycardia 110. Upon admission, patient was ntoed to have temp.   In ED patient was hypotensive  and tachycardic 110, BP stabilised s/p IVF 2 L bolus and broad spectrum IV abx  CXR- rotated film cannot exclude right sided PNA   EKG for 12/29/17- not available for review in muse EKG on in ED chart  WBC 17, elevated lactate 2.8 >>2.9    Patient is extremely poor historian due to underlying dementia.     1/2: patient is very deconditioned, frail, elderly. poor historian but able to provide me with where she is. Labs and vitals reviewed. Care discussed with patient's niece extensively. Labs and vitals reviewed over the phone. Patient's family wants patient comfortable and maintain best possible quality of life. Family is not trying to make patient live longer, just comfortable DNR/DNI.     REVIEW OF SYSTEMS:  poor historian.    Physical Exam:   GENERAL APPEARANCE:  NAD, hemodynamically stable, elderly, frail, very deconditioned, o facial griemace, patient is comfortable.   T(C): 36.5 (01-02-18 @ 10:23), Max: 37.2 (01-01-18 @ 16:15)  HR: 91 (01-02-18 @ 10:23) (67 - 91)  BP: 122/60 (01-02-18 @ 10:23) (122/60 - 185/105)  RR: 15 (01-02-18 @ 10:23) (10 - 18)  SpO2: 96% (01-02-18 @ 10:23) (93% - 99%)  HEENT:  Head is normocephalic    Skin:  Warm and dry without any rash   NECK:  Supple without lymphadenopathy.   HEART:  Regular rate and rhythm. normal S1 and S2, No M/R/G  LUNGS:  Good ins/exp effort, no W/R/R/C  ABDOMEN:  Soft, nontender, nondistended with good bowel sounds heard  EXTREMITIES:  Without cyanosis, clubbing or edema.   NEUROLOGICAL:  Gross nonfocal     Labs:   CBC Full  -  ( 02 Jan 2018 06:42 )  WBC Count : 10.7 K/uL  Hemoglobin : 11.0 g/dL  Hematocrit : 32.8 %  Platelet Count - Automated : 200 K/uL    PT/INR - ( 01 Jan 2018 05:38 )   PT: 9.5 sec;   INR: 0.88 ratio      PTT - ( 01 Jan 2018 05:38 )  PTT:25.7 sec    140        |  105  |  22  ----------------------------<  63<L>  3.4<L>   |  22  |  0.43<L>    Ca    7.8<L>      02 Jan 2018 06:42

## 2018-01-02 NOTE — PROGRESS NOTE ADULT - PROBLEM SELECTOR PLAN 1
- sepsis rules out  - all cultures negative  - suspect possible viral syndrome vs sx secondary to pain  - dc Zosyn  - c/w IVF hydration due to poor po hydration/appetite - sepsis rules out  - all cultures negative  - suspect possible viral syndrome vs sx secondary to pain  - dc Zosyn  - c/w IVF hydration due to poor po hydration / appetite

## 2018-01-03 LAB
ANION GAP SERPL CALC-SCNC: 9 MMOL/L — SIGNIFICANT CHANGE UP (ref 5–17)
BUN SERPL-MCNC: 23 MG/DL — SIGNIFICANT CHANGE UP (ref 7–23)
CALCIUM SERPL-MCNC: 7.8 MG/DL — LOW (ref 8.5–10.1)
CHLORIDE SERPL-SCNC: 104 MMOL/L — SIGNIFICANT CHANGE UP (ref 96–108)
CO2 SERPL-SCNC: 27 MMOL/L — SIGNIFICANT CHANGE UP (ref 22–31)
CREAT SERPL-MCNC: 0.46 MG/DL — LOW (ref 0.5–1.3)
GLUCOSE SERPL-MCNC: 89 MG/DL — SIGNIFICANT CHANGE UP (ref 70–99)
HCT VFR BLD CALC: 31.2 % — LOW (ref 34.5–45)
HGB BLD-MCNC: 10.2 G/DL — LOW (ref 11.5–15.5)
MCHC RBC-ENTMCNC: 29.4 PG — SIGNIFICANT CHANGE UP (ref 27–34)
MCHC RBC-ENTMCNC: 32.8 GM/DL — SIGNIFICANT CHANGE UP (ref 32–36)
MCV RBC AUTO: 89.8 FL — SIGNIFICANT CHANGE UP (ref 80–100)
PLATELET # BLD AUTO: 178 K/UL — SIGNIFICANT CHANGE UP (ref 150–400)
POTASSIUM SERPL-MCNC: 2.9 MMOL/L — CRITICAL LOW (ref 3.5–5.3)
POTASSIUM SERPL-SCNC: 2.9 MMOL/L — CRITICAL LOW (ref 3.5–5.3)
RBC # BLD: 3.47 M/UL — LOW (ref 3.8–5.2)
RBC # FLD: 14.7 % — HIGH (ref 10.3–14.5)
SODIUM SERPL-SCNC: 140 MMOL/L — SIGNIFICANT CHANGE UP (ref 135–145)
WBC # BLD: 7.5 K/UL — SIGNIFICANT CHANGE UP (ref 3.8–10.5)
WBC # FLD AUTO: 7.5 K/UL — SIGNIFICANT CHANGE UP (ref 3.8–10.5)

## 2018-01-03 PROCEDURE — 93010 ELECTROCARDIOGRAM REPORT: CPT

## 2018-01-03 PROCEDURE — 99233 SBSQ HOSP IP/OBS HIGH 50: CPT

## 2018-01-03 RX ORDER — POTASSIUM CHLORIDE 20 MEQ
40 PACKET (EA) ORAL EVERY 4 HOURS
Qty: 0 | Refills: 0 | Status: COMPLETED | OUTPATIENT
Start: 2018-01-03 | End: 2018-01-03

## 2018-01-03 RX ORDER — METOPROLOL TARTRATE 50 MG
12.5 TABLET ORAL
Qty: 0 | Refills: 0 | Status: DISCONTINUED | OUTPATIENT
Start: 2018-01-03 | End: 2018-01-08

## 2018-01-03 RX ORDER — POTASSIUM CHLORIDE 20 MEQ
10 PACKET (EA) ORAL
Qty: 0 | Refills: 0 | Status: COMPLETED | OUTPATIENT
Start: 2018-01-03 | End: 2018-01-03

## 2018-01-03 RX ADMIN — PREGABALIN 1000 MICROGRAM(S): 225 CAPSULE ORAL at 12:05

## 2018-01-03 RX ADMIN — Medication 1 TABLET(S): at 12:07

## 2018-01-03 RX ADMIN — Medication 2.5 MILLIGRAM(S): at 14:01

## 2018-01-03 RX ADMIN — Medication 500 MILLIGRAM(S): at 05:11

## 2018-01-03 RX ADMIN — OXYCODONE HYDROCHLORIDE 10 MILLIGRAM(S): 5 TABLET ORAL at 21:27

## 2018-01-03 RX ADMIN — Medication 40 MILLIEQUIVALENT(S): at 21:25

## 2018-01-03 RX ADMIN — Medication 500 MILLIGRAM(S): at 18:19

## 2018-01-03 RX ADMIN — SIMVASTATIN 20 MILLIGRAM(S): 20 TABLET, FILM COATED ORAL at 21:29

## 2018-01-03 RX ADMIN — PANTOPRAZOLE SODIUM 20 MILLIGRAM(S): 20 TABLET, DELAYED RELEASE ORAL at 12:05

## 2018-01-03 RX ADMIN — ENOXAPARIN SODIUM 40 MILLIGRAM(S): 100 INJECTION SUBCUTANEOUS at 18:19

## 2018-01-03 RX ADMIN — LATANOPROST 1 DROP(S): 0.05 SOLUTION/ DROPS OPHTHALMIC; TOPICAL at 21:25

## 2018-01-03 RX ADMIN — Medication 100 MILLIGRAM(S): at 21:24

## 2018-01-03 RX ADMIN — QUETIAPINE FUMARATE 25 MILLIGRAM(S): 200 TABLET, FILM COATED ORAL at 21:26

## 2018-01-03 RX ADMIN — Medication 1 MILLIGRAM(S): at 12:04

## 2018-01-03 RX ADMIN — GABAPENTIN 100 MILLIGRAM(S): 400 CAPSULE ORAL at 05:11

## 2018-01-03 RX ADMIN — GABAPENTIN 100 MILLIGRAM(S): 400 CAPSULE ORAL at 18:19

## 2018-01-03 RX ADMIN — Medication 25 MILLIGRAM(S): at 21:27

## 2018-01-03 RX ADMIN — Medication 100 MILLIEQUIVALENT(S): at 16:44

## 2018-01-03 RX ADMIN — Medication 12.5 MILLIGRAM(S): at 18:19

## 2018-01-03 RX ADMIN — Medication 40 MILLIEQUIVALENT(S): at 16:44

## 2018-01-03 RX ADMIN — Medication 100 MILLIEQUIVALENT(S): at 18:59

## 2018-01-03 RX ADMIN — Medication 100 MILLIEQUIVALENT(S): at 17:51

## 2018-01-03 NOTE — PROGRESS NOTE ADULT - SUBJECTIVE AND OBJECTIVE BOX
Patient is a 89y old  Female who presents with a chief complaint of sent from [facility] for tachycardia with suspicion for dehydration (30 Dec 2017 03:57)      1/2/2018 POD #1 appears comfortable , denies CP or SOB tele showed just a short burst of SVT  1/3- appears comfortable , no CP   MEDICATIONS  (STANDING):  ascorbic acid 500 milliGRAM(s) Oral two times a day  ceFAZolin   IVPB 2000 milliGRAM(s) IV Intermittent every 8 hours  cyanocobalamin 1000 MICROGram(s) Oral daily  docusate sodium 100 milliGRAM(s) Oral three times a day  enoxaparin Injectable 40 milliGRAM(s) SubCutaneous every 24 hours  folic acid 1 milliGRAM(s) Oral daily  gabapentin 100 milliGRAM(s) Oral two times a day  lactated ringers. 1000 milliLiter(s) (50 mL/Hr) IV Continuous <Continuous>  latanoprost 0.005% Ophthalmic Solution 1 Drop(s) Both EYES at bedtime  multivitamin 1 Tablet(s) Oral daily  pantoprazole  Injectable 20 milliGRAM(s) IV Push daily  QUEtiapine 25 milliGRAM(s) Oral at bedtime  simvastatin 20 milliGRAM(s) Oral at bedtime    MEDICATIONS  (PRN):  acetaminophen   Tablet 650 milliGRAM(s) Oral every 6 hours PRN For Temp greater than 38 C (100.4 F)  benzocaine 15 mG/menthol 3.6 mG Lozenge 1 Lozenge Oral every 3 hours PRN Sore Throat  diphenhydrAMINE   Capsule 25 milliGRAM(s) Oral at bedtime PRN Insomnia  hydrALAZINE Injectable 5 milliGRAM(s) IV Push every 5 minutes PRN sbp>160  HYDROmorphone  Injectable 0.5 milliGRAM(s) IV Push every 4 hours PRN Severe Pain (7 - 10)  ondansetron Injectable 4 milliGRAM(s) IV Push every 6 hours PRN Nausea and/or Vomiting  oxyCODONE    IR 10 milliGRAM(s) Oral every 4 hours PRN Moderate Pain (4 - 6)            Vital Signs Last 24 Hrs  T(C): 36.4 (02 Jan 2018 05:15), Max: 38.1 (01 Jan 2018 09:15)  T(F): 97.5 (02 Jan 2018 05:15), Max: 100.6 (01 Jan 2018 11:10)  HR: 89 (02 Jan 2018 05:15) (67 - 89)  BP: 136/73 (02 Jan 2018 05:15) (127/74 - 185/105)  BP(mean): --  RR: 18 (02 Jan 2018 05:15) (10 - 18)  SpO2: 96% (02 Jan 2018 05:15) (89% - 99%)            INTERPRETATION OF TELEMETRY: SR   burst of SVT yesterday but nothing over the past 24 hours  ECG:        LABS:                        11.0   10.7  )-----------( 200      ( 02 Jan 2018 06:42 )             32.8     01-02    140  |  105  |  22  ----------------------------<  63<L>  3.4<L>   |  22  |  0.43<L>    Ca    7.8<L>      02 Jan 2018 06:42      CARDIAC MARKERS ( 01 Jan 2018 05:38 )  2.770 ng/mL / x     / x     / x     / x          PT/INR - ( 01 Jan 2018 05:38 )   PT: 9.5 sec;   INR: 0.88 ratio         PTT - ( 01 Jan 2018 05:38 )  PTT:25.7 sec    I&O's Summary    01 Jan 2018 07:01  -  02 Jan 2018 07:00  --------------------------------------------------------  IN: 1874 mL / OUT: 1225 mL / NET: 649 mL      BNP  RADIOLOGY & ADDITIONAL STUDIES:

## 2018-01-03 NOTE — PROGRESS NOTE ADULT - SUBJECTIVE AND OBJECTIVE BOX
Patient seen and examined. Pain controlled. No acute events overnight    HEALTH ISSUES - PROBLEM Dx:  NSTEMI, initial episode of care: NSTEMI, initial episode of care  Anemia: Anemia  HLD (hyperlipidemia): HLD (hyperlipidemia)  COPD (chronic obstructive pulmonary disease): COPD (chronic obstructive pulmonary disease)  Left hip subluxation: Left hip subluxation  Dementia: Dementia  Dehydration: Dehydration  Sepsis: Sepsis  Complex renal cyst: Complex renal cyst        MEDICATIONS  (STANDING):  ascorbic acid 500 milliGRAM(s) Oral two times a day  cyanocobalamin 1000 MICROGram(s) Oral daily  docusate sodium 100 milliGRAM(s) Oral three times a day  enoxaparin Injectable 40 milliGRAM(s) SubCutaneous every 24 hours  folic acid 1 milliGRAM(s) Oral daily  gabapentin 100 milliGRAM(s) Oral two times a day  lactated ringers. 1000 milliLiter(s) IV Continuous <Continuous>  latanoprost 0.005% Ophthalmic Solution 1 Drop(s) Both EYES at bedtime  multivitamin 1 Tablet(s) Oral daily  pantoprazole  Injectable 20 milliGRAM(s) IV Push daily  QUEtiapine 25 milliGRAM(s) Oral at bedtime  simvastatin 20 milliGRAM(s) Oral at bedtime    Allergies    No Known Allergies    Intolerances                            11.0   10.7  )-----------( 200      ( 02 Jan 2018 06:42 )             32.8     02 Jan 2018 06:42    140    |  105    |  22     ----------------------------<  63     3.4     |  22     |  0.43     Ca    7.8        02 Jan 2018 06:42        Vital Signs Last 24 Hrs  T(C): 36.4 (01-03-18 @ 05:04), Max: 36.8 (01-02-18 @ 17:40)  T(F): 97.6 (01-03-18 @ 05:04), Max: 98.2 (01-02-18 @ 17:40)  HR: 80 (01-03-18 @ 05:04) (80 - 92)  BP: 141/70 (01-03-18 @ 05:04) (122/60 - 159/85)  BP(mean): --  RR: 16 (01-03-18 @ 05:04) (15 - 17)  SpO2: 96% (01-03-18 @ 05:04) (95% - 96%)    Physical Exam  Gen: NAD  LLE:   Dressing c/d/i  exam difficult 2/t dementia, moving toes/ankle spontaneously  sensation grossly intact  Dp/pt pulse intact  No calf ttp  Compartments soft    A/P:  89y Female sp L IMN POD#2  Pain control  DVT ppx  WBAT  FU labs  Medical management appreciated  Incentive spirometry  Dispo planning

## 2018-01-03 NOTE — PROGRESS NOTE ADULT - SUBJECTIVE AND OBJECTIVE BOX
HPI: 89 yo woman with advanced dementia, admitted 12/29 from [facility] with "tachycardia" - w/u = tachycardia, hypotension, T>99, anemia. Work-up neg for infection - was transfused PRBC's. LLE noted to be externally rotated and shortened - Xray=fracture left femur. Had EKG changes and + troponins. Underwent ORIF last 1/1.   Palliative medicine consulted for "failure to thrive" - severe protein-calorie malnutrition, advanced dementia.     Pt seen and examined by me today with no family present at bedside for followup of goals of care and pain.  Pt is sleeping but wakes easily and answers my questions.  she is a little confused but able to provide basic hx.  She denies any pain in her hip however when I try to straighten her leg she reports mod pain in her left thigh.  She has not had any Oxycodone or Dilaudid in last 24 hrs    She denies SOB.  When asked about feeling hungry she can't give me a direct answer.  She tells me she eats great but then states she just likes to snack.  She has no recollection of her hip surgery.    Overall she reports feeling comfortable.    Per bedside RN she worked with PT today and was able to tolerate sitting in the chair for a while.        PAIN: denies at rest but + left thigh, hip pain with movement    DYSPNEA: denies    ROS:    Anxiety  Depression  Physical Discomfort -left hip with movement  Dyspnea- denies  Constipation  Diarrhea  Nausea  Vomiting  Anorexia- mod  Cough  Secretions  Fatigue  Weakness    Difficult to obtain thorough ROS given pt's dementia      PHYSICAL EXAM:    Vital Signs Last 24 Hrs  T(C): 36.5 (03 Jan 2018 10:43), Max: 36.8 (02 Jan 2018 17:40)  T(F): 97.7 (03 Jan 2018 10:43), Max: 98.2 (02 Jan 2018 17:40)  HR: 80 (03 Jan 2018 10:43) (80 - 92)  BP: 158/82 (03 Jan 2018 10:43) (141/70 - 159/85)  RR: 16 (03 Jan 2018 10:43) (16 - 17)  SpO2: 90% (03 Jan 2018 10:43) (90% - 96%)    General: Awake, alert - appears comfortable. Very thin   HEENT: mucosa moist  Lungs: clear anteriorly  Cardiac: RRR  GI: +BS. Soft, no masses or tenderness  : voiding  Ext: No edema. + tend with movement left leg  Neuro: No gross focal deficit      LABS                          10.2   7.5   )-----------( 178      ( 03 Jan 2018 05:43 )             31.2     01-03    140  |  104  |  23  ----------------------------<  89  2.9<LL>   |  27  |  0.46<L>    Ca    7.8<L>      03 Jan 2018 05:43        RADIOLOGY:    No new studies since 1/1/18

## 2018-01-03 NOTE — PROGRESS NOTE ADULT - PROBLEM SELECTOR PLAN 1
- sepsis rules out  - all cultures negative  - suspect possible viral syndrome vs sx secondary to pain  - dc Zosyn  - c/w IVF hydration due to poor po hydration / appetite

## 2018-01-03 NOTE — PROGRESS NOTE ADULT - SUBJECTIVE AND OBJECTIVE BOX
Patient is a 89 y/o/f w/ pmhx of HTN / HLD / Dementia / COPD presents to ED sent by [facility] for evaluation of sinus tachycardia 110. Upon admission, patient was ntoed to have temp.   In ED patient was hypotensive  and tachycardic 110, BP stabilised s/p IVF 2 L bolus and broad spectrum IV abx  CXR- rotated film cannot exclude right sided PNA   EKG for 12/29/17- not available for review in [facility] EKG on in ED chart  WBC 17, elevated lactate 2.8 >>2.9    Patient is extremely poor historian due to underlying dementia.     1/2: patient is very deconditioned, frail, elderly. poor historian but able to provide me with where she is. Labs and vitals reviewed. Care discussed with patient's niece extensively. Labs and vitals reviewed over the phone. Patient's family wants patient comfortable and maintain best possible quality of life. Family is not trying to make patient live longer, just comfortable DNR/DNI.     1/3: seen and eval. patient extremely decondtioned, frail. sleeping.     REVIEW OF SYSTEMS:  poor historian.    Physical Exam:   GENERAL APPEARANCE:  NAD, hemodynamically stable, elderly, frail, very deconditioned, no facial griemace, patient is comfortable.   ICU Vital Signs Last 24 Hrs  T(C): 36.5 (03 Jan 2018 10:43), Max: 36.8 (02 Jan 2018 17:40)  T(F): 97.7 (03 Jan 2018 10:43), Max: 98.2 (02 Jan 2018 17:40)  HR: 80 (03 Jan 2018 10:43) (80 - 92)  BP: 158/82 (03 Jan 2018 10:43) (141/70 - 159/85)  RR: 16 (03 Jan 2018 10:43) (16 - 17)  SpO2: 90% (03 Jan 2018 10:43) (90% - 96%)  HEENT:  Head is normocephalic    Skin:  Warm and dry without any rash   NECK:  Supple without lymphadenopathy.   HEART:  Regular rate and rhythm. normal S1 and S2, No M/R/G  LUNGS:  Good ins/exp effort, no W/R/R/C  ABDOMEN:  Soft, nontender, nondistended with good bowel sounds heard  EXTREMITIES:  Without cyanosis, clubbing or edema.   NEUROLOGICAL:  Gross nonfocal     Labs:     CBC Full  -  ( 03 Jan 2018 05:43 )  WBC Count : 7.5 K/uL  Hemoglobin : 10.2 g/dL  Hematocrit : 31.2 %  Platelet Count - Automated : 178 K/uL    140  |  104  |  23  ----------------------------<  89  2.9<LL>   |  27  |  0.46<L>    Ca    7.8<L>      03 Jan 2018 05:43

## 2018-01-03 NOTE — PROGRESS NOTE ADULT - SUBJECTIVE AND OBJECTIVE BOX
HPI:  89 Fhx HTN/HLD/Dementia/COPD presents to ED sent by [facility] for evaluation of sinus tachycardia 110 and to   r/o dehydration. ,. In Ed  on arrival per ED physician patient was found to be  thin and frail appearing complaining of no pain.had fever 100.7 in ED  In ED patient was hypotensive  and tachycardic 110, BP stabilised s/p IVF 2 L bolus and broad spectrum IV abx    CXR- rotated film cannot exclude right sided PNA   EKG for 12/29/17- not available for review in [facility] EKG on in ED chart  WBC 17, elevated lactate 2.8 >>2.9  received 2 liters NS IVF bolus and  IV cefepime and vanco IV in ED  currently vitals stable   patient is a poor historian due to hx of dementia .she is sleeping ,but easily arousable with verbal stimuli, oriented to self, place -hospital  she is able to give minimal hx- denies any CP, SOB or abdominal pain or nausea or vomiting   most of the hx was derived from charts and [facility] papers    pmhx/pshx- dementia/HTN/HLD/COPD/Pulmonary nodules,vitamin b12 deficiency, GERD, hx of pelvic , hx of appendectomy, tonsillectomy,vaginal repair   social- lives in [facility]  family hx- non contributory    . (30 Dec 2017 03:57)      Patient is a 89y old  Female who presents with a chief complaint of sent from [facility] for tachycardia with suspicion for dehydration (30 Dec 2017 03:57)  pt found to have Acute comminuted left intertrochanteric femoral fracture on 12-31-18, now s/p left hip im nail on 1-1-18.      Consulted by Dr. Josh Sims for VTE prophylaxis, risk stratification, and anticoagulation management.    PAST MEDICAL & SURGICAL HISTORY:  Dementia  GERD (gastroesophageal reflux disease)  HLD (hyperlipidemia)  HTN (hypertension)  No significant past surgical history    Caprini VTE Risk Score: 9  IMPROVE Bleeding Risk Score: 3.5    Falls Risk:   High (  )  Mod (  )  Low (  )  crcl: 44.46  EBL:  25 ml  1-2-118 Pt seen at bedside.  Pt knows her name and that she is in [facility].  Pt is [place].  Informed her of her Lovenox inj but she is not able to comprehend.  Will reinforce as needed.  1-3-18 Pt seen at bedside no changes.    Vital Signs Last 24 Hrs  T(C): 36.5 (01-03-18 @ 10:43), Max: 36.8 (01-02-18 @ 17:40)  T(F): 97.7 (01-03-18 @ 10:43), Max: 98.2 (01-02-18 @ 17:40)  HR: 80 (01-03-18 @ 10:43) (80 - 92)  BP: 158/82 (01-03-18 @ 10:43) (141/70 - 159/85)  BP(mean): --  RR: 16 (01-03-18 @ 10:43) (16 - 17)  SpO2: 90% (01-03-18 @ 10:43) (90% - 96%)  FAMILY HISTORY:    Denies any personal or familial history of clotting or bleeding disorders.    Allergies    No Known Allergies    Intolerances    REVIEW OF SYSTEMS    (  )Fever	     (  )Constipation	(  )SOB				(  )Headache	(  )Dysuria  (  )Chills	     (  )Melena	(  )Dyspnea present on exertion	                    (  )Dizziness                    (  )Polyuria  (  )Nausea	     (  )Hematochezia	(  )Cough			                    (  )Syncope   	(  )Hematuria  (  )Vomiting    (  )Chest Pain	(  )Wheezing			(  )Weakness  (  )Diarrhea     (  )Palpitations	(  )Anorexia			(  )Myalgia    All other review of systems negative: Yes      PHYSICAL EXAM:    Constitutional: Appears Well    Neurological: A& O x 2 person and place    Skin: Warm    Respiratory and Thorax: normal effort; Breath sounds: normal; No rales/wheezing/rhonchi  	  Cardiovascular: S1, S2, regular, NMBR	    Gastrointestinal: BS + x 4Q, nontender	    Genitourinary:  Bladder nondistended, nontender    Musculoskeletal:   General Right:   no muscle/joint tenderness,   normal tone, no joint swelling,   ROM: limited/full	    General Left:   no muscle/joint tenderness,   normal tone, no joint swelling,   ROM: limited/full    Hip:  Left: Dressing CDI;    Lower extrems:   Right: no calf tenderness              negative nati's sign               + pedal pulses    Left:   no calf tenderness              negative nati's sign               + pedal pulses                           10.2   7.5   )-----------( 178      ( 03 Jan 2018 05:43 )             31.2       01-03    140  |  104  |  23  ----------------------------<  89  2.9<LL>   |  27  |  0.46<L>    Ca    7.8<L>      03 Jan 2018 05:43                             11.0   10.7  )-----------( 200      ( 02 Jan 2018 06:42 )             32.8       01-02    140  |  105  |  22  ----------------------------<  63<L>  3.4<L>   |  22  |  0.43<L>    Ca    7.8<L>      02 Jan 2018 06:42        PT/INR - ( 01 Jan 2018 05:38 )   PT: 9.5 sec;   INR: 0.88 ratio         PTT - ( 01 Jan 2018 05:38 )  PTT:25.7 sec				    MEDICATIONS  (STANDING):  ascorbic acid 500 milliGRAM(s) Oral two times a day  cyanocobalamin 1000 MICROGram(s) Oral daily  docusate sodium 100 milliGRAM(s) Oral three times a day  enalapril 2.5 milliGRAM(s) Oral daily  enoxaparin Injectable 40 milliGRAM(s) SubCutaneous every 24 hours  folic acid 1 milliGRAM(s) Oral daily  gabapentin 100 milliGRAM(s) Oral two times a day  lactated ringers. 1000 milliLiter(s) IV Continuous <Continuous>  latanoprost 0.005% Ophthalmic Solution 1 Drop(s) Both EYES at bedtime  metoprolol     tartrate 12.5 milliGRAM(s) Oral two times a day  multivitamin 1 Tablet(s) Oral daily  pantoprazole  Injectable 20 milliGRAM(s) IV Push daily  QUEtiapine 25 milliGRAM(s) Oral at bedtime  simvastatin 20 milliGRAM(s) Oral at bedtime            DVT Prophylaxis:  LMWH                   (x  )  Heparin SQ           (  )  Coumadin             (  )  Xarelto                  (  )  Eliquis                   (  )  Venodynes           (x  )  Ambulation          ( x )  UFH                       (  )  Contraindicated  (  )

## 2018-01-04 ENCOUNTER — TRANSCRIPTION ENCOUNTER (OUTPATIENT)
Age: 83
End: 2018-01-04

## 2018-01-04 DIAGNOSIS — N17.9 ACUTE KIDNEY FAILURE, UNSPECIFIED: ICD-10-CM

## 2018-01-04 LAB
ANION GAP SERPL CALC-SCNC: 6 MMOL/L — SIGNIFICANT CHANGE UP (ref 5–17)
BUN SERPL-MCNC: 23 MG/DL — SIGNIFICANT CHANGE UP (ref 7–23)
CALCIUM SERPL-MCNC: 8.5 MG/DL — SIGNIFICANT CHANGE UP (ref 8.5–10.1)
CHLORIDE SERPL-SCNC: 108 MMOL/L — SIGNIFICANT CHANGE UP (ref 96–108)
CO2 SERPL-SCNC: 28 MMOL/L — SIGNIFICANT CHANGE UP (ref 22–31)
CREAT SERPL-MCNC: 0.5 MG/DL — SIGNIFICANT CHANGE UP (ref 0.5–1.3)
CULTURE RESULTS: SIGNIFICANT CHANGE UP
CULTURE RESULTS: SIGNIFICANT CHANGE UP
GLUCOSE SERPL-MCNC: 109 MG/DL — HIGH (ref 70–99)
HCT VFR BLD CALC: 33 % — LOW (ref 34.5–45)
HGB BLD-MCNC: 11 G/DL — LOW (ref 11.5–15.5)
MCHC RBC-ENTMCNC: 30.4 PG — SIGNIFICANT CHANGE UP (ref 27–34)
MCHC RBC-ENTMCNC: 33.3 GM/DL — SIGNIFICANT CHANGE UP (ref 32–36)
MCV RBC AUTO: 91.3 FL — SIGNIFICANT CHANGE UP (ref 80–100)
PLATELET # BLD AUTO: 230 K/UL — SIGNIFICANT CHANGE UP (ref 150–400)
POTASSIUM SERPL-MCNC: 4.9 MMOL/L — SIGNIFICANT CHANGE UP (ref 3.5–5.3)
POTASSIUM SERPL-SCNC: 4.9 MMOL/L — SIGNIFICANT CHANGE UP (ref 3.5–5.3)
RBC # BLD: 3.62 M/UL — LOW (ref 3.8–5.2)
RBC # FLD: 15.1 % — HIGH (ref 10.3–14.5)
SODIUM SERPL-SCNC: 142 MMOL/L — SIGNIFICANT CHANGE UP (ref 135–145)
SPECIMEN SOURCE: SIGNIFICANT CHANGE UP
SPECIMEN SOURCE: SIGNIFICANT CHANGE UP
WBC # BLD: 10.5 K/UL — SIGNIFICANT CHANGE UP (ref 3.8–10.5)
WBC # FLD AUTO: 10.5 K/UL — SIGNIFICANT CHANGE UP (ref 3.8–10.5)

## 2018-01-04 RX ORDER — SODIUM CHLORIDE 9 MG/ML
250 INJECTION INTRAMUSCULAR; INTRAVENOUS; SUBCUTANEOUS ONCE
Qty: 0 | Refills: 0 | Status: DISCONTINUED | OUTPATIENT
Start: 2018-01-04 | End: 2018-01-09

## 2018-01-04 RX ORDER — PANTOPRAZOLE SODIUM 20 MG/1
20 TABLET, DELAYED RELEASE ORAL
Qty: 0 | Refills: 0 | Status: DISCONTINUED | OUTPATIENT
Start: 2018-01-05 | End: 2018-01-08

## 2018-01-04 RX ORDER — SODIUM CHLORIDE 9 MG/ML
1000 INJECTION INTRAMUSCULAR; INTRAVENOUS; SUBCUTANEOUS
Qty: 0 | Refills: 0 | Status: DISCONTINUED | OUTPATIENT
Start: 2018-01-04 | End: 2018-01-05

## 2018-01-04 RX ADMIN — LATANOPROST 1 DROP(S): 0.05 SOLUTION/ DROPS OPHTHALMIC; TOPICAL at 21:01

## 2018-01-04 RX ADMIN — QUETIAPINE FUMARATE 25 MILLIGRAM(S): 200 TABLET, FILM COATED ORAL at 21:00

## 2018-01-04 RX ADMIN — Medication 100 MILLIGRAM(S): at 14:02

## 2018-01-04 RX ADMIN — Medication 500 MILLIGRAM(S): at 18:00

## 2018-01-04 RX ADMIN — Medication 1 MILLIGRAM(S): at 11:38

## 2018-01-04 RX ADMIN — GABAPENTIN 100 MILLIGRAM(S): 400 CAPSULE ORAL at 18:00

## 2018-01-04 RX ADMIN — SODIUM CHLORIDE 50 MILLILITER(S): 9 INJECTION INTRAMUSCULAR; INTRAVENOUS; SUBCUTANEOUS at 21:02

## 2018-01-04 RX ADMIN — PANTOPRAZOLE SODIUM 20 MILLIGRAM(S): 20 TABLET, DELAYED RELEASE ORAL at 11:38

## 2018-01-04 RX ADMIN — SIMVASTATIN 20 MILLIGRAM(S): 20 TABLET, FILM COATED ORAL at 21:00

## 2018-01-04 RX ADMIN — Medication 12.5 MILLIGRAM(S): at 06:42

## 2018-01-04 RX ADMIN — Medication 1 TABLET(S): at 11:39

## 2018-01-04 RX ADMIN — Medication 100 MILLIGRAM(S): at 21:00

## 2018-01-04 RX ADMIN — Medication 500 MILLIGRAM(S): at 06:39

## 2018-01-04 RX ADMIN — GABAPENTIN 100 MILLIGRAM(S): 400 CAPSULE ORAL at 06:40

## 2018-01-04 RX ADMIN — ENOXAPARIN SODIUM 40 MILLIGRAM(S): 100 INJECTION SUBCUTANEOUS at 17:59

## 2018-01-04 RX ADMIN — Medication 2.5 MILLIGRAM(S): at 06:40

## 2018-01-04 RX ADMIN — Medication 12.5 MILLIGRAM(S): at 18:00

## 2018-01-04 RX ADMIN — Medication 100 MILLIGRAM(S): at 06:39

## 2018-01-04 RX ADMIN — PREGABALIN 1000 MICROGRAM(S): 225 CAPSULE ORAL at 11:38

## 2018-01-04 NOTE — PROGRESS NOTE ADULT - SUBJECTIVE AND OBJECTIVE BOX
Patient seen and examined. Pain controlled.    Physical exam  VS: Afebrile, vital signs stable  Gen: NAD  Left LE: Dressing clean, dry, and intact. Moving extremity spontaneously, but does not cooperate with neurovascular exam. +Capillary refill brisk. Compartments soft and nontender.    89F s/p left hip IMN POD# 3    WBAT  Pain control  DVT prophylaxis  PT  Ortho stable for discharge

## 2018-01-04 NOTE — PROGRESS NOTE ADULT - SUBJECTIVE AND OBJECTIVE BOX
Patient seen and examined. Pain controlled.    Physical exam  VS: Afebrile, vital signs stable  Gen: NAD  Left LE: Dressing clean, dry, and intact. Moving extremity spontaneously. Does not cooperate with neurovascular exam. +Dorsalis pedis, capillary refill brisk. Compartments soft and nontender.      89F s/p left hip IMN POD# 3    WBAT  Pain control  DVT prophylaxis  PT  Ortho stable for discharge

## 2018-01-04 NOTE — DISCHARGE NOTE ADULT - HOSPITAL COURSE
89 y/o/f w/ pmhx of HTN / HLD / Dementia / COPD sent to ED from  [facility] for evaluation of sinus tachycardia 110. Upon admission, patient was noted to have temp.  In ED patient was hypotensive  and tachycardic 110, BP stabilised s/p IVF 2 L bolus and broad spectrum IV abx. CXR showed possible  right sided PNA. WBC 17, elevated lactate. Pt was admitted for sepsis due to PNA. Also was noted that Pt has L LE pain and XR was done showing < from: Xray Hip w/ Pelvis 2 or 3 Views, Left (12.31.17 @ 19:09) >  Acute comminuted left intertrochanteric femoral fracture and also  Chronic R distal radial metaphysis FX.  Pt was evaluated By Ortho and was taken to OR after cardiac clearance for mildly elevated troponins.  S/p  Antegrade intramedullary nailing of femur. Pt tolerated procedure well. Hospital course complicated by poor oral intake and further Fx decline.  Refusing food and meds. Poor urine outPut. Palliative team on board and discussed Pts condition  and prognosis with Family who agreed for comfort oriented management and transfer to [facility].     Vital Signs Last 24 Hrs  T(C): 36.6 (09 Jan 2018 10:18), Max: 36.7 (09 Jan 2018 04:53)  T(F): 97.9 (09 Jan 2018 10:18), Max: 98.1 (09 Jan 2018 04:53)  HR: 96 (09 Jan 2018 10:18) (80 - 96)  BP: 138/78 (09 Jan 2018 10:18) (138/78 - 151/82)  RR: 16 (09 Jan 2018 10:18) (16 - 18)  SpO2: 94% (09 Jan 2018 10:18) (93% - 97%)    GENERAL APPEARANCE:  elderly, frail, very deconditioned, NAD  HEENT:  Head is normocephalic. EOMI  Skin:  Warm and dry without any rash   NECK:  Supple  no JVD   HEART:  Regular rate and rhythm. Normal S1 and S2, No Murmurs   LUNGS: Decreased BS at bases b/l. No wheezing or rhonchi  ABDOMEN:  Soft, nontender, nondistended, + BS   EXTREMITIES: No pedal edema B/l  NEUROLOGICAL:  Grossly  non focal   MUSCULOSKELETAL: L thigh edema  and ecchymosis  better.  Dressing intact     Problem/Plan - 1:  ·  Problem: Sepsis.  Plan: - sepsis ruled out  - all cultures negative  - CT chest did not confirm PNA   - suspect possible viral syndrome vs sx secondary to pain or NSTEMI   - Off ABXS, was on  Zosyn  - c/w  IVF hydration due to poor po hydration / appetite.     Problem/Plan - 2:  ·  Problem: Dehydration.  Plan: - due to poor oral intake   - BP stable   - UO still decreased despite IVF. Likely insufficient  Oral intake   - c/w  IVF hydration  - Monitor  UO  -Still min oral intake.   - Failure to thrive.     Problem/Plan - 3:  ·  Problem: Dementia.  Plan: - supportive care.     Problem/Plan - 4:  ·  Problem: Left hip subluxation.  Plan: - IMN Left IT fx POD # 6  - Control pain Tylenol and Oxy 5mg only if not enough pain control     - PT in am to evluate if can participate in PT session   - DVT ppx  - Incentive spirometry  - WBAT  - F/u with Ortho.     Problem/Plan - 5:  ·  Problem: COPD (chronic obstructive pulmonary disease).  Plan: -respiratory status is stable  -nebs prn.     Problem/Plan - 6:  Problem: HLD (hyperlipidemia). Plan: -statin.    Problem/Plan - 7:  ·  Problem: Anemia.  Plan: -suspect acute blood loss secondary to hip fx with bleeding into the thigh  - stool occult blood neg   - H/H stable   - S/p 2 units PRBC postop.     Problem/Plan - 8:  ·  Problem: NSTEMI, initial episode of care.  Plan: - elevated troponins, no CP   - continue bblocker for PSVT and pos trop    - C/w  ACEI for nonSTEMI  - Tele": No SVT epsiodes since 1/3  - EKG changes noted  - cardiology follow up with Dr. Harrison.     Problem/Plan - 9:  ·  Problem: NATTY (acute kidney injury).  Plan: with Azotemia on admission  - Cr likely at baseline now.   - Monitor closely. 89 y/o/f w/ pmhx of HTN / HLD / Dementia / COPD sent to ED from  [facility] for evaluation of sinus tachycardia 110. Upon admission, patient was noted to have temp.  In ED patient was hypotensive  and tachycardic 110, BP stabilised s/p IVF 2 L bolus and broad spectrum IV abx. CXR showed possible  right sided PNA. WBC 17, elevated lactate. Pt was admitted for sepsis due to PNA.  Further work up did not confirm PNA and Cx were neg. Also was noted that Pt has L LE pain and XR was done showing < from: Xray Hip w/ Pelvis 2 or 3 Views, Left (12.31.17 @ 19:09) >  Acute comminuted left intertrochanteric femoral fracture and also  Chronic R distal radial metaphysis FX.  Pt was evaluated By Ortho and was taken to OR after cardiac clearance for mildly elevated troponins.  S/p  Antegrade intramedullary nailing of femur. Pt tolerated procedure well. Hospital course complicated by poor oral intake and further Fx decline.  Refusing food and meds. Poor urine outPut. Palliative team on board and discussed Pts condition  and prognosis with Family who agreed for comfort oriented management and transfer to [facility]. Today pt is more confused, anxious,  states no pain.     Vital Signs Last 24 Hrs  T(C): 36.6 (09 Jan 2018 10:18), Max: 36.7 (09 Jan 2018 04:53)  T(F): 97.9 (09 Jan 2018 10:18), Max: 98.1 (09 Jan 2018 04:53)  HR: 96 (09 Jan 2018 10:18) (80 - 96)  BP: 138/78 (09 Jan 2018 10:18) (138/78 - 151/82)  RR: 16 (09 Jan 2018 10:18) (16 - 18)  SpO2: 94% (09 Jan 2018 10:18) (93% - 97%)      GENERAL APPEARANCE:  elderly, frail, very deconditioned, mildly  restless   HEENT:  Head is normocephalic. EOMI  Skin:  Warm and dry without any rash   NECK:  Supple  no JVD   HEART:  Regular rate and rhythm. Normal S1 and S2, No Murmurs   LUNGS: Decreased BS at bases b/l. No wheezing or rhonchi  ABDOMEN:  Soft, nontender, nondistended, + BS   EXTREMITIES: No pedal edema B/l  NEUROLOGICAL:  Grossly  non focal   MUSCULOSKELETAL: L thigh edema  and ecchymosis  better.  Dressing intact     1. Problem    Sepsis,  sepsis ruled out  - all cultures negative  - CT chest did not confirm PNA   - suspect possible viral syndrome vs sx secondary to pain or NSTEMI   - Off ABXS, was on  Zosyn      Problem - 2:   Dehydration.   Failure to thrive.   due to  advanced dementia and poor oral intake   - UO still decreased despite IVF. Likely insufficient  Oral intake   - Fluids discontinued   -Still min oral intake.       Problem 3:   Dementia.  Plan: - supportive care.     Problem - 4:   Left hip subluxation.  S/p  IMN Left IT fx POD # 7  - Control pain with IV pain meds as per refused PO    - was followed by PT but was not able to participate    - WBAT    Problem- 5:  COPD (chronic obstructive pulmonary disease). -respiratory status is stable  supplement Nasal canula PRN       Problem - 6  Anemia. suspect acute blood loss secondary to hip fx with bleeding into the thigh  - stool occult blood neg   - H/H stable   - S/p 2 units PRBC postop.     Problem- 7:   NSTEMI, - elevated troponins, no CP   comfort care       Problem 8:   NATTY (acute kidney injury)  with Azotemia on admission  - Cr likely at baseline now.     D/w Palliative team, family agreed on [facility], will transfer when  bed available

## 2018-01-04 NOTE — PROGRESS NOTE ADULT - SUBJECTIVE AND OBJECTIVE BOX
Patient is a 89 y/o/f w/ pmhx of HTN / HLD / Dementia / COPD sent to ED from  [facility] living for evaluation of sinus tachycardia 110. Upon admission, patient was noted to have temp.  In ED patient was hypotensive  and tachycardic 110, BP stabilised s/p IVF 2 L bolus and broad spectrum IV abx. CXR showed possible  right sided PNA. WBC 17, elevated lactate 2.8 >>2.9    1/4: Chart reviewed, Pt was seen and examined. Uncomfortable in pain.  Baseline confused. No fevers       REVIEW OF SYSTEMS:  Unable to obtain due to baseline dementia     Physical Exam:   Vital Signs Last 24 Hrs  T(C): 36.4 (04 Jan 2018 04:51), Max: 36.4 (04 Jan 2018 04:51)  T(F): 97.6 (04 Jan 2018 04:51), Max: 97.6 (04 Jan 2018 04:51)  HR: 99 (04 Jan 2018 04:51) (81 - 99)  BP: 107/60 (04 Jan 2018 06:39) (87/50 - 116/95)  RR: 20 (04 Jan 2018 04:51) (17 - 20)  SpO2: 98% (04 Jan 2018 04:51) (98% - 98%)      GENERAL APPEARANCE:  elderly, frail, very deconditioned, uncomfortable in pain   HEENT:  Head is normocephalic. EOMI  Skin:  Warm and dry without any rash   NECK:  Supple  no JVD   HEART:  Regular rate and rhythm. Normal S1 and S2, No Murmurs   LUNGS: Decreased BS at bases b/l. No wheezing or rhonchi  ABDOMEN:  Soft, nontender, nondistended, + BS   EXTREMITIES: No pedal edema B/l  NEUROLOGICAL:  Grossly  nonfocal   MUSCULOSKELETAL: L thigh edema  and ecchymosis + tenderness to palpation and   with movement. Dressing intact       Labs:                           11.0   10.5  )-----------( 230      ( 04 Jan 2018 05:54 )             33.0     01-04    142  |  108  |  23  ----------------------------<  109<H>  4.9   |  28  |  0.50    Ca    8.5      04 Jan 2018 05:54

## 2018-01-04 NOTE — DISCHARGE NOTE ADULT - CARE PLAN
Principal Discharge DX:	Hip fracture  Goal:	Return to baseline ADLs  Instructions for follow-up, activity and diet:	1.	Pain control  2.	Walking with full weight bearing as tolerated, with assistive devices as needed  3.	DVT prophylaxis  4.	PT as needed  5.	Follow up with Dr Sims as outpatient in 10-14 days after discharge from the hospital or rehab. Call office for appointment.  6.	Remove staples post-op day 14, and remove dressing post-op day 10, with dressing changes as needed.  7.	Ice/elevate affected area as needed.  8.	Keep dressing clean and dry. Principal Discharge DX:	Hip fracture  Goal:	Return to baseline ADLs as tolerated  Instructions for follow-up, activity and diet:	1.	Pain control  2.	Walking with full weight bearing as tolerated, with assistive devices as needed  3.	DVT prophylaxis  4.	PT as needed  5.	Follow up with Dr Sims as outpatient in 10-14 days after discharge from the hospital or rehab. Call office for appointment.  6.	Remove staples post-op day 14, and remove dressing post-op day 10, with dressing changes as needed.  7.	Ice/elevate affected area as needed.  8.	Keep dressing clean and dry.  Secondary Diagnosis:	Failure to thrive in adult  Goal:	comfort  Instructions for follow-up, activity and diet:	comfort care at [facility]

## 2018-01-04 NOTE — PROGRESS NOTE ADULT - PROBLEM SELECTOR PLAN 4
- IMN Left IT fx POD # 3  - Control pain    - PT   - DVT ppx  - Incentive spirometry  - WBAT  - F/u with Ortho

## 2018-01-04 NOTE — PROGRESS NOTE ADULT - PROBLEM SELECTOR PLAN 2
- due to poor oral intake   - BP on lower side, poor UO  - C/w IVF hydration, will give bolus   - Monitor  UO

## 2018-01-04 NOTE — DISCHARGE NOTE ADULT - MEDICATION SUMMARY - MEDICATIONS TO STOP TAKING
I will STOP taking the medications listed below when I get home from the hospital:    amLODIPine 5 mg oral tablet  -- 1 tab(s) by mouth once a day    aspirin 81 mg oral tablet, chewable  -- 1 tab(s) by mouth once a day    Lasix 20 mg oral tablet  -- 1 tab(s) by mouth once a day    metoprolol tartrate 25 mg oral tablet  -- 1 tab(s) by mouth 2 times a day    Neurontin 100 mg oral capsule  -- orally 2 times a day    Pepcid 20 mg oral tablet  -- orally once a day    potassium chloride 15 mEq oral powder for reconstitution  -- orally once a day    SEROquel 25 mg oral tablet  -- 12.5 milligram(s) by mouth once a day    simvastatin 20 mg oral tablet  -- 1 tab(s) by mouth once a day (at bedtime)    Vitamin D2  -- 5000  by mouth once a day    famotidine 20 mg oral tablet  -- 1 tab(s) by mouth once a day (at bedtime)    K-Dur 10 oral tablet, extended release  -- 1 tab(s) by mouth once a day    Vitamin D3 50,000 intl units oral capsule  -- 1 cap(s) by mouth every 2 weeks    Vitamin B12 1000 mcg oral tablet  -- 1 tab(s) by mouth once a day

## 2018-01-04 NOTE — DISCHARGE NOTE ADULT - PLAN OF CARE
Return to baseline ADLs 1.	Pain control  2.	Walking with full weight bearing as tolerated, with assistive devices as needed  3.	DVT prophylaxis  4.	PT as needed  5.	Follow up with Dr Sims as outpatient in 10-14 days after discharge from the hospital or rehab. Call office for appointment.  6.	Remove staples post-op day 14, and remove dressing post-op day 10, with dressing changes as needed.  7.	Ice/elevate affected area as needed.  8.	Keep dressing clean and dry. Return to baseline ADLs as tolerated comfort comfort care at [facility]

## 2018-01-04 NOTE — PROGRESS NOTE ADULT - PROBLEM SELECTOR PLAN 1
- sepsis rules out  - all cultures negative  - CT chest did not confirm PNA   - suspect possible viral syndrome vs sx secondary to pain or NSTEMI   - Off ABXS, was on  Zosyn  - c/w  IVF hydration due to poor po hydration / appetite

## 2018-01-04 NOTE — DISCHARGE NOTE ADULT - MEDICATION SUMMARY - MEDICATIONS TO TAKE
I will START or STAY ON the medications listed below when I get home from the hospital:    HYDROmorphone  -- 0.2 milligram(s) intravenously every 2 hours, As Needed for pain or SOB  -- Indication: For Pain/SOB    LORazepam  -- 0.5 milligram(s) intravenous every 6 hours, As Needed for anxiety  -- Indication: For Anxiety    ondansetron 2 mg/mL injectable solution  -- 4 milligram(s) injectable 3 times a day, As Needed for nausea or vomiting   -- Indication: For Nausea    glycopyrrolate 0.2 mg/mL injectable solution  -- 0.2 milligram(s) intravenously every 6 hours, As Needed  -- Indication: For Secretions     latanoprost 0.005% ophthalmic solution  -- 1 drop(s) to each affected eye once a day (in the evening)  -- Indication: For glaucoma

## 2018-01-05 LAB
ANION GAP SERPL CALC-SCNC: 6 MMOL/L — SIGNIFICANT CHANGE UP (ref 5–17)
BUN SERPL-MCNC: 21 MG/DL — SIGNIFICANT CHANGE UP (ref 7–23)
CALCIUM SERPL-MCNC: 7.9 MG/DL — LOW (ref 8.5–10.1)
CHLORIDE SERPL-SCNC: 111 MMOL/L — HIGH (ref 96–108)
CO2 SERPL-SCNC: 27 MMOL/L — SIGNIFICANT CHANGE UP (ref 22–31)
CREAT SERPL-MCNC: 0.35 MG/DL — LOW (ref 0.5–1.3)
GLUCOSE SERPL-MCNC: 100 MG/DL — HIGH (ref 70–99)
HCT VFR BLD CALC: 31.8 % — LOW (ref 34.5–45)
HGB BLD-MCNC: 10.1 G/DL — LOW (ref 11.5–15.5)
MCHC RBC-ENTMCNC: 29.8 PG — SIGNIFICANT CHANGE UP (ref 27–34)
MCHC RBC-ENTMCNC: 31.9 GM/DL — LOW (ref 32–36)
MCV RBC AUTO: 93.4 FL — SIGNIFICANT CHANGE UP (ref 80–100)
PLATELET # BLD AUTO: 200 K/UL — SIGNIFICANT CHANGE UP (ref 150–400)
POTASSIUM SERPL-MCNC: 4.5 MMOL/L — SIGNIFICANT CHANGE UP (ref 3.5–5.3)
POTASSIUM SERPL-SCNC: 4.5 MMOL/L — SIGNIFICANT CHANGE UP (ref 3.5–5.3)
RBC # BLD: 3.4 M/UL — LOW (ref 3.8–5.2)
RBC # FLD: 15.1 % — HIGH (ref 10.3–14.5)
SODIUM SERPL-SCNC: 144 MMOL/L — SIGNIFICANT CHANGE UP (ref 135–145)
WBC # BLD: 9 K/UL — SIGNIFICANT CHANGE UP (ref 3.8–10.5)
WBC # FLD AUTO: 9 K/UL — SIGNIFICANT CHANGE UP (ref 3.8–10.5)

## 2018-01-05 PROCEDURE — 99233 SBSQ HOSP IP/OBS HIGH 50: CPT

## 2018-01-05 RX ORDER — OXYCODONE HYDROCHLORIDE 5 MG/1
5 TABLET ORAL EVERY 6 HOURS
Qty: 0 | Refills: 0 | Status: DISCONTINUED | OUTPATIENT
Start: 2018-01-05 | End: 2018-01-08

## 2018-01-05 RX ORDER — SODIUM CHLORIDE 9 MG/ML
1000 INJECTION INTRAMUSCULAR; INTRAVENOUS; SUBCUTANEOUS
Qty: 0 | Refills: 0 | Status: DISCONTINUED | OUTPATIENT
Start: 2018-01-05 | End: 2018-01-08

## 2018-01-05 RX ORDER — OXYCODONE HYDROCHLORIDE 5 MG/1
10 TABLET ORAL EVERY 6 HOURS
Qty: 0 | Refills: 0 | Status: DISCONTINUED | OUTPATIENT
Start: 2018-01-05 | End: 2018-01-05

## 2018-01-05 RX ADMIN — Medication 100 MILLIGRAM(S): at 06:07

## 2018-01-05 RX ADMIN — GABAPENTIN 100 MILLIGRAM(S): 400 CAPSULE ORAL at 06:06

## 2018-01-05 RX ADMIN — Medication 1 TABLET(S): at 11:19

## 2018-01-05 RX ADMIN — QUETIAPINE FUMARATE 25 MILLIGRAM(S): 200 TABLET, FILM COATED ORAL at 21:40

## 2018-01-05 RX ADMIN — HYDROMORPHONE HYDROCHLORIDE 0.5 MILLIGRAM(S): 2 INJECTION INTRAMUSCULAR; INTRAVENOUS; SUBCUTANEOUS at 04:53

## 2018-01-05 RX ADMIN — Medication 500 MILLIGRAM(S): at 06:06

## 2018-01-05 RX ADMIN — PREGABALIN 1000 MICROGRAM(S): 225 CAPSULE ORAL at 11:19

## 2018-01-05 RX ADMIN — Medication 2.5 MILLIGRAM(S): at 06:06

## 2018-01-05 RX ADMIN — PANTOPRAZOLE SODIUM 20 MILLIGRAM(S): 20 TABLET, DELAYED RELEASE ORAL at 06:06

## 2018-01-05 RX ADMIN — Medication 1 MILLIGRAM(S): at 11:19

## 2018-01-05 RX ADMIN — Medication 12.5 MILLIGRAM(S): at 06:06

## 2018-01-05 RX ADMIN — SIMVASTATIN 20 MILLIGRAM(S): 20 TABLET, FILM COATED ORAL at 21:40

## 2018-01-05 NOTE — PROGRESS NOTE ADULT - SUBJECTIVE AND OBJECTIVE BOX
HPI:  89 Fhx HTN/HLD/Dementia/COPD presents to ED sent by [facility] for evaluation of sinus tachycardia 110 and to   r/o dehydration. ,. In Ed  on arrival per ED physician patient was found to be  thin and frail appearing complaining of no pain.had fever 100.7 in ED  In ED patient was hypotensive  and tachycardic 110, BP stabilised s/p IVF 2 L bolus and broad spectrum IV abx    CXR- rotated film cannot exclude right sided PNA   EKG for 12/29/17- not available for review in muse EKG on in ED chart  WBC 17, elevated lactate 2.8 >>2.9  received 2 liters NS IVF bolus and  IV cefepime and vanco IV in ED  currently vitals stable   patient is a poor historian due to hx of dementia .she is sleeping ,but easily arousable with verbal stimuli, oriented to self, place -hospital  she is able to give minimal hx- denies any CP, SOB or abdominal pain or nausea or vomiting   most of the hx was derived from charts and [facility] papers    pmhx/pshx- dementia/HTN/HLD/COPD/Pulmonary nodules,vitamin b12 deficiency, GERD, hx of pelvic , hx of appendectomy, tonsillectomy,vaginal repair   social- lives in [facility]  family hx- non contributory    . (30 Dec 2017 03:57)      Patient is a 89y old  Female who presents with a chief complaint of sent from [facility] for tachycardia with suspicion for dehydration (30 Dec 2017 03:57)  pt found to have Acute comminuted left intertrochanteric femoral fracture on 12-31-18, now s/p left hip im nail on 1-1-18.      Consulted by Dr. Josh Sims for VTE prophylaxis, risk stratification, and anticoagulation management.    PAST MEDICAL & SURGICAL HISTORY:  Dementia  GERD (gastroesophageal reflux disease)  HLD (hyperlipidemia)  HTN (hypertension)  No significant past surgical history    Caprini VTE Risk Score: 9  IMPROVE Bleeding Risk Score: 3.5    Falls Risk:   High (  )  Mod (  )  Low (  )  crcl: 44.46  EBL:  25 ml  1-2-118 Pt seen at bedside.  Pt knows her name and that she is in [facility].  Pt is [place].  Informed her of her Lovenox inj but she is not able to comprehend.  Will reinforce as needed.  1-3-18 Pt seen at bedside no changes.  1/5/18: OOB to chair, no changes per nurse    Vital Signs Last 24 Hrs  T(C): 36.2 (01-05-18 @ 10:30), Max: 36.8 (01-05-18 @ 05:01)  T(F): 97.2 (01-05-18 @ 10:30), Max: 98.3 (01-05-18 @ 05:01)  HR: 91 (01-05-18 @ 10:30) (78 - 93)  BP: 121/63 (01-05-18 @ 10:30) (119/51 - 140/70)  BP(mean): --  RR: 18 (01-05-18 @ 10:30) (18 - 19)  SpO2: 93% (01-05-18 @ 10:30) (93% - 99%)              FAMILY HISTORY:    Denies any personal or familial history of clotting or bleeding disorders.    Allergies    No Known Allergies    Intolerances    REVIEW OF SYSTEMS    (  )Fever	     (  )Constipation	(  )SOB				(  )Headache	(  )Dysuria  (  )Chills	     (  )Melena	(  )Dyspnea present on exertion	                    (  )Dizziness                    (  )Polyuria  (  )Nausea	     (  )Hematochezia	(  )Cough			                    (  )Syncope   	(  )Hematuria  (  )Vomiting    (  )Chest Pain	(  )Wheezing			(  )Weakness  (  )Diarrhea     (  )Palpitations	(  )Anorexia			(  )Myalgia    All other review of systems negative: Yes      PHYSICAL EXAM:    Constitutional: Appears Well    Neurological: demented    Skin: Warm    Respiratory and Thorax: normal effort; Breath sounds: normal; No rales/wheezing/rhonchi  	  Cardiovascular: S1, S2, regular, NMBR	    Gastrointestinal: BS + x 4Q, nontender	    Genitourinary:  Bladder nondistended, nontender    Musculoskeletal:   General Right:   no muscle/joint tenderness,   normal tone, no joint swelling,   ROM: limited/full	    General Left:   no muscle/joint tenderness,   normal tone, no joint swelling,   ROM: limited/full    Hip:  Left: Dressing CDI;    Lower extrems:   Right: no calf tenderness              negative nati's sign               + pedal pulses    Left:   no calf tenderness              negative nati's sign               + pedal pulses                            10.1   9.0   )-----------( 200      ( 05 Jan 2018 07:01 )             31.8       01-05    144  |  111<H>  |  21  ----------------------------<  100<H>  4.5   |  27  |  0.35<L>    Ca    7.9<L>      05 Jan 2018 07:01                                 10.2   7.5   )-----------( 178      ( 03 Jan 2018 05:43 )             31.2       01-03    140  |  104  |  23  ----------------------------<  89  2.9<LL>   |  27  |  0.46<L>    Ca    7.8<L>      03 Jan 2018 05:43                             11.0   10.7  )-----------( 200      ( 02 Jan 2018 06:42 )             32.8       01-02    140  |  105  |  22  ----------------------------<  63<L>  3.4<L>   |  22  |  0.43<L>    Ca    7.8<L>      02 Jan 2018 06:42        PT/INR - ( 01 Jan 2018 05:38 )   PT: 9.5 sec;   INR: 0.88 ratio         PTT - ( 01 Jan 2018 05:38 )  PTT:25.7 sec				    MEDICATIONS  (STANDING):  ascorbic acid 500 milliGRAM(s) Oral two times a day  cyanocobalamin 1000 MICROGram(s) Oral daily  docusate sodium 100 milliGRAM(s) Oral three times a day  enalapril 2.5 milliGRAM(s) Oral daily  enoxaparin Injectable 40 milliGRAM(s) SubCutaneous every 24 hours  folic acid 1 milliGRAM(s) Oral daily  gabapentin 100 milliGRAM(s) Oral two times a day  lactated ringers. 1000 milliLiter(s) IV Continuous <Continuous>  latanoprost 0.005% Ophthalmic Solution 1 Drop(s) Both EYES at bedtime  metoprolol     tartrate 12.5 milliGRAM(s) Oral two times a day  multivitamin 1 Tablet(s) Oral daily  pantoprazole  Injectable 20 milliGRAM(s) IV Push daily  QUEtiapine 25 milliGRAM(s) Oral at bedtime  simvastatin 20 milliGRAM(s) Oral at bedtime            DVT Prophylaxis:  LMWH                   (x  )  Heparin SQ           (  )  Coumadin             (  )  Xarelto                  (  )  Eliquis                   (  )  Venodynes           (x  )  Ambulation          ( x )  UFH                       (  )  Contraindicated  (  )

## 2018-01-05 NOTE — PROGRESS NOTE ADULT - PROBLEM SELECTOR PLAN 4
- IMN Left IT fx POD # 4  - Control pain, will d/c Dilaudid in case if makes make tired and confused. C/w Tylenol and Oxy 5mg only if not enough pain control     - PT   - DVT ppx  - Incentive spirometry  - WBAT  - F/u with Ortho

## 2018-01-05 NOTE — PROGRESS NOTE ADULT - PROBLEM SELECTOR PLAN 1
- sepsis ruled out  - all cultures negative  - CT chest did not confirm PNA   - suspect possible viral syndrome vs sx secondary to pain or NSTEMI   - Off ABXS, was on  Zosyn  - c/w  IVF hydration due to poor po hydration / appetite

## 2018-01-05 NOTE — CHART NOTE - NSCHARTNOTEFT_GEN_A_CORE
Assessment: Nutrition Follow up     Factors impacting intake: [ ] none [ ] nausea  [ ] vomiting [ ] diarrhea [ ] constipation  [ ]chewing problems [ ] swallowing issues  [ x] other: Advanced dementia    Diet Presciption: Diet, Dysphagia 1 Pureed-Nectar Consistency Fluid (01-04-18 @ 12:57)    Intake: <10%    Current Weight: Weight (kg): 39.463 (01-02 @ 13:22)      Pertinent Medications: MEDICATIONS  (STANDING):  ascorbic acid 500 milliGRAM(s) Oral two times a day  cyanocobalamin 1000 MICROGram(s) Oral daily  docusate sodium 100 milliGRAM(s) Oral three times a day  enalapril 2.5 milliGRAM(s) Oral daily  enoxaparin Injectable 40 milliGRAM(s) SubCutaneous every 24 hours  folic acid 1 milliGRAM(s) Oral daily  gabapentin 100 milliGRAM(s) Oral two times a day  latanoprost 0.005% Ophthalmic Solution 1 Drop(s) Both EYES at bedtime  metoprolol     tartrate 12.5 milliGRAM(s) Oral two times a day  multivitamin 1 Tablet(s) Oral daily  pantoprazole    Tablet 20 milliGRAM(s) Oral before breakfast  QUEtiapine 25 milliGRAM(s) Oral at bedtime  simvastatin 20 milliGRAM(s) Oral at bedtime  sodium chloride 0.9% Bolus 250 milliLiter(s) IV Bolus once  sodium chloride 0.9%. 1000 milliLiter(s) (50 mL/Hr) IV Continuous <Continuous>    MEDICATIONS  (PRN):  acetaminophen   Tablet 650 milliGRAM(s) Oral every 6 hours PRN For Temp greater than 38 C (100.4 F)  benzocaine 15 mG/menthol 3.6 mG Lozenge 1 Lozenge Oral every 3 hours PRN Sore Throat  bisacodyl Suppository 10 milliGRAM(s) Rectal daily PRN If no bowel movement  diphenhydrAMINE   Capsule 25 milliGRAM(s) Oral at bedtime PRN Insomnia  hydrALAZINE Injectable 5 milliGRAM(s) IV Push every 5 minutes PRN sbp>160  ondansetron Injectable 4 milliGRAM(s) IV Push every 6 hours PRN Nausea and/or Vomiting  oxyCODONE    IR 5 milliGRAM(s) Oral every 6 hours PRN Moderate Pain (4 - 6)  oxyCODONE    IR 10 milliGRAM(s) Oral every 6 hours PRN Severe Pain (7 - 10)    Pertinent Labs: 01-05 Na144 mmol/L Glu 100 mg/dL<H> K+ 4.5 mmol/L Cr  0.35 mg/dL<L> BUN 21 mg/dL Phos n/a   Alb n/a   PAB n/a        CAPILLARY BLOOD GLUCOSE        Skin: No changes     Estimated Needs:   [x ] no change since previous assessment  [ ] recalculated:     Previous Nutrition Diagnosis:   · Nutrition Diagnostic Terminology #1: Nutrient  · Nutrient: Malnutrition; Pt meets criteria for severe protein/calorie malnutrition in context of chronic illness secondary to severe muscle/fat wasting.  · Etiology: Advanced age, dementia, PNA, Dehydration, unhealed pressure ulcer  · Signs/Symptoms: <25% intake, lethargy, diff. chewing/swallowing, BMI 15.2, severe fat/muscle wasting, Stage 1PU  · Nutrition Intervention: Meals and Snack; Medical Food Supplements; Feeding Assistance  · Meals and Snacks: Texture-modified diet  · Medical and Food Supplements: Commercial beverage; Modified beverage; Prosource and ensure enlive  · Feeding Assistance: Feeding Assistance  · Goal/Expected Outcome: PO intake >50% of meals/supplements, No s/s of malnutrition, healing pressure ulcer, BMI >19.0    Nutrition Diagnosis is [ x] ongoing  [ ] resolved [ ] not applicable     NOTES SUMMARY:   Pt continues to consume <10% of meals/supplements. As per RN spooning liquids and pt consuming only sips when alert. Minimal urine output noted. S/P palliative care consult with possible d/c to hospice. End stage dementia expected poor prognosis and family is aware.         Monitoring and Evaluation:   [x ] PO intake [  ] Tolerance to diet prescription [  ] weights  labs[ x ] follow up per protocol  [ ] other:

## 2018-01-05 NOTE — PROGRESS NOTE ADULT - ATTENDING COMMENTS
POC d/w Palliative team and Dr Verona Shanks, Pt  looks weak and unlikely will  be able to participate in PT at this point. Will continue hydration and adjust pain meds. Hopefully Pts PO intake improves. If not then could be candidate for Home Hospice at the facility. That was by Dr Garcia with [facility]

## 2018-01-05 NOTE — PROGRESS NOTE ADULT - PROBLEM SELECTOR PLAN 2
- due to poor oral intake   - BP better  - UO still decreased despite IVF bolus 500ml yesterday  - increase IVF hydration  - Monitor  UO  -Will monitor if Oral intake improves with pain meds adjustment

## 2018-01-05 NOTE — PROGRESS NOTE ADULT - SUBJECTIVE AND OBJECTIVE BOX
Patient is a 89 y/o/f w/ pmhx of HTN / HLD / Dementia / COPD sent to ED from  [facility] for evaluation of sinus tachycardia 110. Upon admission, patient was noted to have temp.  In ED patient was hypotensive  and tachycardic 110, BP stabilised s/p IVF 2 L bolus and broad spectrum IV abx. CXR showed possible  right sided PNA. WBC 17, elevated lactate 2.8 >>2.9    1/4: Chart reviewed, Pt was seen and examined. Uncomfortable in pain.  Baseline confused. No fevers   1/5: Pt was seen and examined, OOB to chair, comfortable, baseline confused, answering some Qs. Reports no pain.  RN reports min oral intake and low UO.        REVIEW OF SYSTEMS:  Unable to obtain due to baseline dementia     Physical Exam:   Vital Signs Last 24 Hrs  T(C): 36.4 (04 Jan 2018 04:51), Max: 36.4 (04 Jan 2018 04:51)  T(F): 97.6 (04 Jan 2018 04:51), Max: 97.6 (04 Jan 2018 04:51)  HR: 99 (04 Jan 2018 04:51) (81 - 99)  BP: 107/60 (04 Jan 2018 06:39) (87/50 - 116/95)  RR: 20 (04 Jan 2018 04:51) (17 - 20)  SpO2: 98% (04 Jan 2018 04:51) (98% - 98%)      GENERAL APPEARANCE:  elderly, frail, very deconditioned, uncomfortable in pain   HEENT:  Head is normocephalic. EOMI  Skin:  Warm and dry without any rash   NECK:  Supple  no JVD   HEART:  Regular rate and rhythm. Normal S1 and S2, No Murmurs   LUNGS: Decreased BS at bases b/l. No wheezing or rhonchi  ABDOMEN:  Soft, nontender, nondistended, + BS   EXTREMITIES: No pedal edema B/l  NEUROLOGICAL:  Grossly  non focal   MUSCULOSKELETAL: L thigh edema  and ecchymosis  better.  Dressing intact       Labs:                         10.1   9.0   )-----------( 200      ( 05 Jan 2018 07:01 )             31.8     01-05    144  |  111<H>  |  21  ----------------------------<  100<H>  4.5   |  27  |  0.35<L>    Ca    7.9<L>      05 Jan 2018 07:01                              11.0   10.5  )-----------( 230      ( 04 Jan 2018 05:54 )             33.0     01-04    142  |  108  |  23  ----------------------------<  109<H>  4.9   |  28  |  0.50    Ca    8.5      04 Jan 2018 05:54

## 2018-01-05 NOTE — PROGRESS NOTE ADULT - SUBJECTIVE AND OBJECTIVE BOX
HPI: 89 yo woman with advanced dementia, admitted 12/29 from [facility] with "tachycardia" - w/u = tachycardia, hypotension, T>99, anemia. Work-up neg for infection - was transfused PRBC's. LLE noted to be externally rotated and shortened - Xray=fracture left femur. Had EKG changes and + troponins. Underwent ORIF last 1/1.   Palliative medicine consulted for "failure to thrive" - severe protein-calorie malnutrition, advanced dementia.     Pt seen and examined by me today with no family present at bedside for followup of goals of care and pain.  Pt is sleeping in chair - difficult to rouse.   She has not had any Oxycodone and only one dose of  Dilaudid in last 24 hrs    Bedside RN reports pt has had very little to eat or drink in past 2 days - minimal urine output.    Case reviewed with Dr KOBY Abbasi and pt's grand-niece (by phone)          PAIN: ( )Yes   ( )No  DOES not appear to be in pain but non-verbal now ; cannot answer  Level:  Location:  Intensity:    /10  Quality:  Aggravating Factors:  Alleviating Factors:  Radiation:  Duration/Timing:  Impact on ADLs:    DYSPNEA: ( ) Yes  ( ) No  Level:        Review of Systems: Cannot ascertain - pt lethargic    Anxiety-  Depression-  Physical Discomfort-  Dyspnea-  Constipation-  Diarrhea-  Nausea-  Vomiting-  Anorexia-  Weight Loss-   Cough-  Secretions-  Fatigue-  Weakness-  Delirium-      Unable to obtain : pt lethargic; non-verbal        PHYSICAL EXAM:    Vital Signs Last 24 Hrs  T(C): 36.2 (05 Jan 2018 10:30), Max: 36.8 (05 Jan 2018 05:01)  T(F): 97.2 (05 Jan 2018 10:30), Max: 98.3 (05 Jan 2018 05:01)  HR: 91 (05 Jan 2018 10:30) (78 - 93)  BP: 121/63 (05 Jan 2018 10:30) (119/51 - 140/70)  BP(mean): --  RR: 18 (05 Jan 2018 10:30) (18 - 19)  SpO2: 93% (05 Jan 2018 10:30) (93% - 99%)  Daily     Daily     PPSV2:  10-20 %  FAST:    General: Lethargic, pale,sitting in Deisy chair  HEENT: oral mucosa dry  Lungs: decreased BS bases   Cardiac: RRR  GI: +BS Soft, no masses or tenderness  : Molina with ~120 cc yellow urine  Ext: LLE with 1-2+edema, wound clean and dry  Neuro: Lethargic      LABS:                        10.1   9.0   )-----------( 200      ( 05 Jan 2018 07:01 )             31.8     01-05    144  |  111<H>  |  21  ----------------------------<  100<H>  4.5   |  27  |  0.35<L>    Ca    7.9<L>      05 Jan 2018 07:01        Albumin: Albumin, Serum: 3.1 g/dL (12-30 @ 05:50)      Allergies    No Known Allergies    Intolerances      MEDICATIONS  (STANDING):  ascorbic acid 500 milliGRAM(s) Oral two times a day  cyanocobalamin 1000 MICROGram(s) Oral daily  docusate sodium 100 milliGRAM(s) Oral three times a day  enalapril 2.5 milliGRAM(s) Oral daily  enoxaparin Injectable 40 milliGRAM(s) SubCutaneous every 24 hours  folic acid 1 milliGRAM(s) Oral daily  gabapentin 100 milliGRAM(s) Oral two times a day  latanoprost 0.005% Ophthalmic Solution 1 Drop(s) Both EYES at bedtime  metoprolol     tartrate 12.5 milliGRAM(s) Oral two times a day  multivitamin 1 Tablet(s) Oral daily  pantoprazole    Tablet 20 milliGRAM(s) Oral before breakfast  QUEtiapine 25 milliGRAM(s) Oral at bedtime  simvastatin 20 milliGRAM(s) Oral at bedtime  sodium chloride 0.9% Bolus 250 milliLiter(s) IV Bolus once  sodium chloride 0.9%. 1000 milliLiter(s) (50 mL/Hr) IV Continuous <Continuous>    MEDICATIONS  (PRN):  acetaminophen   Tablet 650 milliGRAM(s) Oral every 6 hours PRN For Temp greater than 38 C (100.4 F)  benzocaine 15 mG/menthol 3.6 mG Lozenge 1 Lozenge Oral every 3 hours PRN Sore Throat  bisacodyl Suppository 10 milliGRAM(s) Rectal daily PRN If no bowel movement  diphenhydrAMINE   Capsule 25 milliGRAM(s) Oral at bedtime PRN Insomnia  hydrALAZINE Injectable 5 milliGRAM(s) IV Push every 5 minutes PRN sbp>160  ondansetron Injectable 4 milliGRAM(s) IV Push every 6 hours PRN Nausea and/or Vomiting  oxyCODONE    IR 5 milliGRAM(s) Oral every 6 hours PRN Moderate Pain (4 - 6)  oxyCODONE    IR 10 milliGRAM(s) Oral every 6 hours PRN Severe Pain (7 - 10)      RADIOLOGY:

## 2018-01-06 PROCEDURE — 99233 SBSQ HOSP IP/OBS HIGH 50: CPT

## 2018-01-06 RX ADMIN — SODIUM CHLORIDE 75 MILLILITER(S): 9 INJECTION INTRAMUSCULAR; INTRAVENOUS; SUBCUTANEOUS at 00:03

## 2018-01-06 RX ADMIN — Medication 500 MILLIGRAM(S): at 17:11

## 2018-01-06 RX ADMIN — Medication 500 MILLIGRAM(S): at 06:09

## 2018-01-06 RX ADMIN — Medication 1 MILLIGRAM(S): at 13:10

## 2018-01-06 RX ADMIN — LATANOPROST 1 DROP(S): 0.05 SOLUTION/ DROPS OPHTHALMIC; TOPICAL at 22:08

## 2018-01-06 RX ADMIN — SIMVASTATIN 20 MILLIGRAM(S): 20 TABLET, FILM COATED ORAL at 22:08

## 2018-01-06 RX ADMIN — ENOXAPARIN SODIUM 40 MILLIGRAM(S): 100 INJECTION SUBCUTANEOUS at 17:13

## 2018-01-06 RX ADMIN — Medication 1 TABLET(S): at 13:10

## 2018-01-06 RX ADMIN — GABAPENTIN 100 MILLIGRAM(S): 400 CAPSULE ORAL at 06:09

## 2018-01-06 RX ADMIN — PANTOPRAZOLE SODIUM 20 MILLIGRAM(S): 20 TABLET, DELAYED RELEASE ORAL at 06:09

## 2018-01-06 RX ADMIN — QUETIAPINE FUMARATE 25 MILLIGRAM(S): 200 TABLET, FILM COATED ORAL at 22:08

## 2018-01-06 RX ADMIN — Medication 12.5 MILLIGRAM(S): at 06:09

## 2018-01-06 RX ADMIN — PREGABALIN 1000 MICROGRAM(S): 225 CAPSULE ORAL at 13:10

## 2018-01-06 RX ADMIN — Medication 12.5 MILLIGRAM(S): at 17:11

## 2018-01-06 RX ADMIN — Medication 2.5 MILLIGRAM(S): at 06:09

## 2018-01-06 RX ADMIN — Medication 100 MILLIGRAM(S): at 22:08

## 2018-01-06 NOTE — PROGRESS NOTE ADULT - ATTENDING COMMENTS
POC d/w Palliative team and Dr Verona Shanks, Pt  looks weak and unlikely will  be able to participate in PT at this point. Will continue hydration and adjust pain meds. Hopefully Pts PO intake improves. If not then could be candidate for Home Hospice at the facility. That was by Dr Garcia with [facility] POC d/w Palliative team and Dr Verona Shanks on 1/5. Pt  looks weak and unlikely will  be able to participate in PT at this point. Will continue hydration and adjust pain meds. Hopefully Pts PO intake improves. If not then could be candidate for Home Hospice at the facility. That was by Dr Garcia with [facility]

## 2018-01-06 NOTE — PROGRESS NOTE ADULT - SUBJECTIVE AND OBJECTIVE BOX
Patient is a 89 y/o/f w/ pmhx of HTN / HLD / Dementia / COPD sent to ED from  [facility] for evaluation of sinus tachycardia 110. Upon admission, patient was noted to have temp.  In ED patient was hypotensive  and tachycardic 110, BP stabilised s/p IVF 2 L bolus and broad spectrum IV abx. CXR showed possible  right sided PNA. WBC 17, elevated lactate 2.8 >>2.9    1/4: Chart reviewed, Pt was seen and examined. Uncomfortable in pain.  Baseline confused. No fevers   1/5: Pt was seen and examined, OOB to chair, comfortable, baseline confused, answering some Qs. Reports no pain.  RN reports min oral intake and low UO.        REVIEW OF SYSTEMS:  Unable to obtain due to baseline dementia     Physical Exam:   Vital Signs Last 24 Hrs  T(C): 36.6 (06 Jan 2018 05:46), Max: 36.7 (05 Jan 2018 17:04)  T(F): 97.9 (06 Jan 2018 05:46), Max: 98.1 (05 Jan 2018 17:04)  HR: 69 (06 Jan 2018 05:46) (69 - 80)  BP: 165/82 (06 Jan 2018 05:46) (138/68 - 165/82)  RR: 18 (05 Jan 2018 17:04) (18 - 18)  SpO2: 94% (06 Jan 2018 05:46) (92% - 94%)      GENERAL APPEARANCE:  elderly, frail, very deconditioned, uncomfortable in pain   HEENT:  Head is normocephalic. EOMI  Skin:  Warm and dry without any rash   NECK:  Supple  no JVD   HEART:  Regular rate and rhythm. Normal S1 and S2, No Murmurs   LUNGS: Decreased BS at bases b/l. No wheezing or rhonchi  ABDOMEN:  Soft, nontender, nondistended, + BS   EXTREMITIES: No pedal edema B/l  NEUROLOGICAL:  Grossly  non focal   MUSCULOSKELETAL: L thigh edema  and ecchymosis  better.  Dressing intact       Labs:                         10.1   9.0   )-----------( 200      ( 05 Jan 2018 07:01 )             31.8     01-05    144  |  111<H>  |  21  ----------------------------<  100<H>  4.5   |  27  |  0.35<L>    Ca    7.9<L>      05 Jan 2018 07:01                              11.0   10.5  )-----------( 230      ( 04 Jan 2018 05:54 )             33.0     01-04    142  |  108  |  23  ----------------------------<  109<H>  4.9   |  28  |  0.50    Ca    8.5      04 Jan 2018 05:54 Patient is a 89 y/o/f w/ pmhx of HTN / HLD / Dementia / COPD sent to ED from  [facility] for evaluation of sinus tachycardia 110. Upon admission, patient was noted to have temp.  In ED patient was hypotensive  and tachycardic 110, BP stabilised s/p IVF 2 L bolus and broad spectrum IV abx. CXR showed possible  right sided PNA. WBC 17, elevated lactate 2.8 >>2.9    1/4: Chart reviewed, Pt was seen and examined. Uncomfortable in pain.  Baseline confused. No fevers   1/5: Pt was seen and examined, OOB to chair, comfortable, baseline confused, answering some Qs. Reports no pain.  RN reports min oral intake and low UO.    1/6: Pt was seen and examined, OOb to chair, more alert, baseline confused, looks comfortable. Denies pain.       REVIEW OF SYSTEMS:  Unable to obtain due to baseline dementia     Physical Exam:   Vital Signs Last 24 Hrs  T(C): 36.2 (06 Jan 2018 17:05), Max: 36.6 (06 Jan 2018 05:46)  T(F): 97.2 (06 Jan 2018 17:05), Max: 97.9 (06 Jan 2018 05:46)  HR: 86 (06 Jan 2018 17:05) (69 - 86)  BP: 119/91 (06 Jan 2018 17:05) (119/91 - 165/82)  RR: 16 (06 Jan 2018 17:05) (16 - 16)  SpO2: 100% (06 Jan 2018 17:05) (89% - 100%)      GENERAL APPEARANCE:  elderly, frail, very deconditioned, NAD  HEENT:  Head is normocephalic. EOMI  Skin:  Warm and dry without any rash   NECK:  Supple  no JVD   HEART:  Regular rate and rhythm. Normal S1 and S2, No Murmurs   LUNGS: Decreased BS at bases b/l. No wheezing or rhonchi  ABDOMEN:  Soft, nontender, nondistended, + BS   EXTREMITIES: No pedal edema B/l  NEUROLOGICAL:  Grossly  non focal   MUSCULOSKELETAL: L thigh edema  and ecchymosis  better.  Dressing intact       Labs:                         10.1   9.0   )-----------( 200      ( 05 Jan 2018 07:01 )             31.8     01-05    144  |  111<H>  |  21  ----------------------------<  100<H>  4.5   |  27  |  0.35<L>    Ca    7.9<L>      05 Jan 2018 07:01                              11.0   10.5  )-----------( 230      ( 04 Jan 2018 05:54 )             33.0     01-04    142  |  108  |  23  ----------------------------<  109<H>  4.9   |  28  |  0.50    Ca    8.5      04 Jan 2018 05:54

## 2018-01-06 NOTE — PROGRESS NOTE ADULT - PROBLEM SELECTOR PLAN 2
- due to poor oral intake   - BP better  - UO still decreased despite IVF bolus 500ml yesterday  - increase IVF hydration  - Monitor  UO  -Will monitor if Oral intake improves with pain meds adjustment - due to poor oral intake   - BP better  - UO still decreased despite IVF. Likely insufficient  Oral intake   - c/w  IVF hydration  - Monitor  UO  -Will monitor if Oral intake improves with pain meds adjustment

## 2018-01-06 NOTE — PROGRESS NOTE ADULT - SUBJECTIVE AND OBJECTIVE BOX
HPI:  89 Fhx HTN/HLD/Dementia/COPD presents to ED sent by [facility] for evaluation of sinus tachycardia 110 and to   r/o dehydration. ,. In Ed  on arrival per ED physician patient was found to be  thin and frail appearing complaining of no pain.had fever 100.7 in ED  In ED patient was hypotensive  and tachycardic 110, BP stabilised s/p IVF 2 L bolus and broad spectrum IV abx    CXR- rotated film cannot exclude right sided PNA   EKG for 12/29/17- not available for review in [facility] EKG on in ED chart  WBC 17, elevated lactate 2.8 >>2.9  received 2 liters NS IVF bolus and  IV cefepime and vanco IV in ED  currently vitals stable   patient is a poor historian due to hx of dementia .she is sleeping ,but easily arousable with verbal stimuli, oriented to self, place -hospital  she is able to give minimal hx- denies any CP, SOB or abdominal pain or nausea or vomiting   most of the hx was derived from charts and atri papers    pmhx/pshx- dementia/HTN/HLD/COPD/Pulmonary nodules,vitamin b12 deficiency, GERD, hx of pelvic , hx of appendectomy, tonsillectomy,vaginal repair   social- lives in [facility]  family hx- non contributory    . (30 Dec 2017 03:57)      Patient is a 89y old  Female who presents with a chief complaint of sent from [facility] for tachycardia with suspicion for dehydration (30 Dec 2017 03:57)  pt found to have Acute comminuted left intertrochanteric femoral fracture on 12-31-18, now s/p left hip im nail on 1-1-18.      Consulted by Dr. Josh Sims for VTE prophylaxis, risk stratification, and anticoagulation management.    PAST MEDICAL & SURGICAL HISTORY:  Dementia  GERD (gastroesophageal reflux disease)  HLD (hyperlipidemia)  HTN (hypertension)  No significant past surgical history    Caprini VTE Risk Score: 9  IMPROVE Bleeding Risk Score: 3.5    Falls Risk:   High (  )  Mod (  )  Low (  )    crcl: 44.46  EBL:  25 ml    1-2-118 Pt seen at bedside.  Pt knows her name and that she is in [facility].  Pt is [place].  Informed her of her Lovenox inj but she is not able to comprehend.  Will reinforce as needed.  1-3-18 Pt seen at bedside no changes.  1/5/18: OOB to chair, no changes per nurse  1/6/18: Pt seen at bedside, asleep but easily arousable, with 4LNC and confused.     FAMILY HISTORY:    Denies any personal or familial history of clotting or bleeding disorders.    Allergies    No Known Allergies    Intolerances    REVIEW OF SYSTEMS    (  )Fever	     (  )Constipation	(  )SOB				(  )Headache	(  )Dysuria  (  )Chills	     (  )Melena	(  )Dyspnea present on exertion	                    (  )Dizziness                    (  )Polyuria  (  )Nausea	     (  )Hematochezia	(  )Cough			                    (  )Syncope   	(  )Hematuria  (  )Vomiting    (  )Chest Pain	(  )Wheezing			(  )Weakness  (  )Diarrhea     (  )Palpitations	(  )Anorexia			(  )Myalgia    All other review of systems negative: Yes      PHYSICAL EXAM:    Constitutional: Appears Well    Neurological: demented    Skin: Warm    Respiratory and Thorax: normal effort; Breath sounds: normal; No rales/wheezing/rhonchi  	  Cardiovascular: S1, S2, regular, NMBR	    Gastrointestinal: BS + x 4Q, nontender	    Genitourinary:  Bladder nondistended, nontender, drummond catheter with clear, yellow urine     Musculoskeletal:   General Right:   no muscle/joint tenderness,   normal tone, no joint swelling,   ROM: limited/full	    General Left:   no muscle/joint tenderness,   normal tone, no joint swelling,   ROM: limited/full    Hip:  Left: Dressing CDI    Lower extrems:   Right: no calf tenderness              negative nati's sign               + pedal pulses    Left:   no calf tenderness              negative nati's sign               + pedal pulses                          10.1   9.0   )-----------( 200      ( 05 Jan 2018 07:01 )             31.8       01-05    144  |  111<H>  |  21  ----------------------------<  100<H>  4.5   |  27  |  0.35<L>    Ca    7.9<L>      05 Jan 2018 07:01      PT/INR - ( 01 Jan 2018 05:38 )   PT: 9.5 sec;   INR: 0.88 ratio    PTT - ( 01 Jan 2018 05:38 )  PTT:25.7 sec				      Vital Signs Last 24 Hrs  T(C): 36.6 (01-06-18 @ 05:46), Max: 36.7 (01-05-18 @ 17:04)  T(F): 97.9 (01-06-18 @ 05:46), Max: 98.1 (01-05-18 @ 17:04)  HR: 69 (01-06-18 @ 05:46) (69 - 91)  BP: 165/82 (01-06-18 @ 05:46) (121/63 - 165/82)  BP(mean): --  RR: 18 (01-05-18 @ 17:04) (18 - 18)  SpO2: 94% (01-06-18 @ 05:46) (92% - 94%)    MEDICATIONS  (STANDING):  ascorbic acid 500 milliGRAM(s) Oral two times a day  cyanocobalamin 1000 MICROGram(s) Oral daily  docusate sodium 100 milliGRAM(s) Oral three times a day  enalapril 2.5 milliGRAM(s) Oral daily  enoxaparin Injectable 40 milliGRAM(s) SubCutaneous every 24 hours  folic acid 1 milliGRAM(s) Oral daily  gabapentin 100 milliGRAM(s) Oral two times a day  latanoprost 0.005% Ophthalmic Solution 1 Drop(s) Both EYES at bedtime  metoprolol     tartrate 12.5 milliGRAM(s) Oral two times a day  multivitamin 1 Tablet(s) Oral daily  pantoprazole    Tablet 20 milliGRAM(s) Oral before breakfast  QUEtiapine 25 milliGRAM(s) Oral at bedtime  simvastatin 20 milliGRAM(s) Oral at bedtime  sodium chloride 0.9% Bolus 250 milliLiter(s) IV Bolus once  sodium chloride 0.9%. 1000 milliLiter(s) (75 mL/Hr) IV Continuous <Continuous>    DVT Prophylaxis:  LMWH                   (x )  Heparin SQ           (  )  Coumadin             (  )  Xarelto                  (  )  Eliquis                   (  )  Venodynes           (x  )  Ambulation          ( x )  UFH                       (  )  Contraindicated  (  )

## 2018-01-06 NOTE — PROGRESS NOTE ADULT - PROBLEM SELECTOR PLAN 4
- IMN Left IT fx POD # 4  - Control pain, will d/c Dilaudid in case if makes make tired and confused. C/w Tylenol and Oxy 5mg only if not enough pain control     - PT   - DVT ppx  - Incentive spirometry  - WBAT  - F/u with Ortho - IMN Left IT fx POD # 5  - Control pain, will d/c Dilaudid in case if makes make tired and confused. C/w Tylenol and Oxy 5mg only if not enough pain control     - PT   - DVT ppx  - Incentive spirometry  - WBAT  - F/u with Ortho

## 2018-01-07 LAB
ANION GAP SERPL CALC-SCNC: 7 MMOL/L — SIGNIFICANT CHANGE UP (ref 5–17)
BUN SERPL-MCNC: 16 MG/DL — SIGNIFICANT CHANGE UP (ref 7–23)
CALCIUM SERPL-MCNC: 8 MG/DL — LOW (ref 8.5–10.1)
CHLORIDE SERPL-SCNC: 108 MMOL/L — SIGNIFICANT CHANGE UP (ref 96–108)
CO2 SERPL-SCNC: 29 MMOL/L — SIGNIFICANT CHANGE UP (ref 22–31)
CREAT SERPL-MCNC: 0.26 MG/DL — LOW (ref 0.5–1.3)
GLUCOSE SERPL-MCNC: 86 MG/DL — SIGNIFICANT CHANGE UP (ref 70–99)
HCT VFR BLD CALC: 35.7 % — SIGNIFICANT CHANGE UP (ref 34.5–45)
HGB BLD-MCNC: 11.2 G/DL — LOW (ref 11.5–15.5)
MCHC RBC-ENTMCNC: 29.6 PG — SIGNIFICANT CHANGE UP (ref 27–34)
MCHC RBC-ENTMCNC: 31.3 GM/DL — LOW (ref 32–36)
MCV RBC AUTO: 94.6 FL — SIGNIFICANT CHANGE UP (ref 80–100)
PLATELET # BLD AUTO: 259 K/UL — SIGNIFICANT CHANGE UP (ref 150–400)
POTASSIUM SERPL-MCNC: 4 MMOL/L — SIGNIFICANT CHANGE UP (ref 3.5–5.3)
POTASSIUM SERPL-SCNC: 4 MMOL/L — SIGNIFICANT CHANGE UP (ref 3.5–5.3)
RBC # BLD: 3.77 M/UL — LOW (ref 3.8–5.2)
RBC # FLD: 16.1 % — HIGH (ref 10.3–14.5)
SODIUM SERPL-SCNC: 144 MMOL/L — SIGNIFICANT CHANGE UP (ref 135–145)
WBC # BLD: 8.8 K/UL — SIGNIFICANT CHANGE UP (ref 3.8–10.5)
WBC # FLD AUTO: 8.8 K/UL — SIGNIFICANT CHANGE UP (ref 3.8–10.5)

## 2018-01-07 PROCEDURE — 99233 SBSQ HOSP IP/OBS HIGH 50: CPT

## 2018-01-07 RX ADMIN — OXYCODONE HYDROCHLORIDE 5 MILLIGRAM(S): 5 TABLET ORAL at 09:26

## 2018-01-07 RX ADMIN — Medication 2.5 MILLIGRAM(S): at 06:10

## 2018-01-07 RX ADMIN — GABAPENTIN 100 MILLIGRAM(S): 400 CAPSULE ORAL at 06:10

## 2018-01-07 RX ADMIN — PANTOPRAZOLE SODIUM 20 MILLIGRAM(S): 20 TABLET, DELAYED RELEASE ORAL at 06:10

## 2018-01-07 RX ADMIN — Medication 100 MILLIGRAM(S): at 06:09

## 2018-01-07 RX ADMIN — SODIUM CHLORIDE 75 MILLILITER(S): 9 INJECTION INTRAMUSCULAR; INTRAVENOUS; SUBCUTANEOUS at 03:02

## 2018-01-07 RX ADMIN — Medication 12.5 MILLIGRAM(S): at 06:10

## 2018-01-07 RX ADMIN — OXYCODONE HYDROCHLORIDE 5 MILLIGRAM(S): 5 TABLET ORAL at 21:03

## 2018-01-07 RX ADMIN — OXYCODONE HYDROCHLORIDE 5 MILLIGRAM(S): 5 TABLET ORAL at 20:07

## 2018-01-07 RX ADMIN — Medication 500 MILLIGRAM(S): at 06:09

## 2018-01-07 NOTE — PROGRESS NOTE ADULT - PROBLEM SELECTOR PLAN 4
- IMN Left IT fx POD # 6  - Control pain Tylenol and Oxy 5mg only if not enough pain control     - PT in am to evluate if can participate in PT session   - DVT ppx  - Incentive spirometry  - WBAT  - F/u with Ortho

## 2018-01-07 NOTE — PROGRESS NOTE ADULT - SUBJECTIVE AND OBJECTIVE BOX
HPI:  89 Fhx HTN/HLD/Dementia/COPD presents to ED sent by [facility] for evaluation of sinus tachycardia 110 and to   r/o dehydration. ,. In Ed  on arrival per ED physician patient was found to be  thin and frail appearing complaining of no pain.had fever 100.7 in ED  In ED patient was hypotensive  and tachycardic 110, BP stabilised s/p IVF 2 L bolus and broad spectrum IV abx    CXR- rotated film cannot exclude right sided PNA   EKG for 12/29/17- not available for review in [facility] EKG on in ED chart  WBC 17, elevated lactate 2.8 >>2.9  received 2 liters NS IVF bolus and  IV cefepime and vanco IV in ED  currently vitals stable   patient is a poor historian due to hx of dementia .she is sleeping ,but easily arousable with verbal stimuli, oriented to self, place -hospital  she is able to give minimal hx- denies any CP, SOB or abdominal pain or nausea or vomiting   most of the hx was derived from charts and atri papers    pmhx/pshx- dementia/HTN/HLD/COPD/Pulmonary nodules,vitamin b12 deficiency, GERD, hx of pelvic , hx of appendectomy, tonsillectomy,vaginal repair   social- lives in [facility]  family hx- non contributory    . (30 Dec 2017 03:57)      Patient is a 89y old  Female who presents with a chief complaint of sent from [facility] for tachycardia with suspicion for dehydration (30 Dec 2017 03:57)  pt found to have Acute comminuted left intertrochanteric femoral fracture on 12-31-18, now s/p left hip im nail on 1-1-18.      Consulted by Dr. Josh Sims for VTE prophylaxis, risk stratification, and anticoagulation management.    PAST MEDICAL & SURGICAL HISTORY:  Dementia  GERD (gastroesophageal reflux disease)  HLD (hyperlipidemia)  HTN (hypertension)  No significant past surgical history    Caprini VTE Risk Score: 9    IMPROVE Bleeding Risk Score: 3.5    Falls Risk:   High ( X )  Mod (  )  Low (  )    crcl: 44.46  EBL:  25 ml    1-2-118 Pt seen at bedside.  Pt knows her name and that she is in [facility].  Pt is [place].  Informed her of her Lovenox inj but she is not able to comprehend.  Will reinforce as needed.  1-3-18 Pt seen at bedside no changes.  1/5/18: OOB to chair, no changes per nurse  1/6/18: Pt seen at bedside, asleep but easily arousable, with 4LNC and confused.   1/7/18: Pt seen at bedside with primary RN and speech therapist, pt has poor PO intake, continues to be confused. As per primary RN and hospitalist' s chart, pt may possibly return to [facility].     FAMILY HISTORY:    Denies any personal or familial history of clotting or bleeding disorders.    Allergies    No Known Allergies    Intolerances    REVIEW OF SYSTEMS    (  )Fever	     (  )Constipation	(  )SOB				(  )Headache	(  )Dysuria  (  )Chills	     (  )Melena	(  )Dyspnea present on exertion	                    (  )Dizziness                    (  )Polyuria  (  )Nausea	     (  )Hematochezia	(  )Cough			                    (  )Syncope   	(  )Hematuria  (  )Vomiting    (  )Chest Pain	(  )Wheezing			(  )Weakness  (  )Diarrhea     (  )Palpitations	(  )Anorexia			(  )Myalgia    All other review of systems negative: Yes      PHYSICAL EXAM:    Constitutional: Appears confused    Neurological: demented    Skin: Warm    Respiratory and Thorax: normal effort; Breath sounds: normal; No rales/wheezing/rhonchi  	  Cardiovascular: S1, S2, regular, NMBR	    Gastrointestinal: BS + x 4Q, nontender	    Genitourinary:  Bladder nondistended, nontender, drummond catheter with clear, yellow urine     Musculoskeletal:   General Right:   no muscle/joint tenderness,   normal tone, no joint swelling,   ROM: limited  contracted in RUE	    General Left:   no muscle/joint tenderness,   normal tone, no joint swelling,   ROM: limited  contracted in LUE    Hip:  Left: Dressing CDI    Lower extrems:   Right: no calf tenderness              negative nati's sign               + pedal pulses  1+ pitting edema of RLE    Left:   no calf tenderness              negative nati's sign               + pedal pulses  3+ pitting edema of LLE  	                        11.2   8.8   )-----------( 259      ( 07 Jan 2018 06:04 )             35.7       01-07    144  |  108  |  16  ----------------------------<  86  4.0   |  29  |  0.26<L>    Ca    8.0<L>      07 Jan 2018 06:04    Vital Signs Last 24 Hrs  T(C): 36.2 (01-06-18 @ 17:05), Max: 36.2 (01-06-18 @ 12:47)  T(F): 97.2 (01-06-18 @ 17:05), Max: 97.2 (01-06-18 @ 17:05)  HR: 82 (01-07-18 @ 05:46) (82 - 86)  BP: 156/80 (01-07-18 @ 05:46) (119/91 - 156/80)  BP(mean): --  RR: 16 (01-06-18 @ 17:05) (16 - 16)  SpO2: 100% (01-06-18 @ 17:05) (89% - 100%)    MEDICATIONS  (STANDING):  ascorbic acid 500 milliGRAM(s) Oral two times a day  cyanocobalamin 1000 MICROGram(s) Oral daily  docusate sodium 100 milliGRAM(s) Oral three times a day  enalapril 2.5 milliGRAM(s) Oral daily  enoxaparin Injectable 40 milliGRAM(s) SubCutaneous every 24 hours  folic acid 1 milliGRAM(s) Oral daily  gabapentin 100 milliGRAM(s) Oral two times a day  latanoprost 0.005% Ophthalmic Solution 1 Drop(s) Both EYES at bedtime  metoprolol     tartrate 12.5 milliGRAM(s) Oral two times a day  multivitamin 1 Tablet(s) Oral daily  pantoprazole    Tablet 20 milliGRAM(s) Oral before breakfast  QUEtiapine 25 milliGRAM(s) Oral at bedtime  simvastatin 20 milliGRAM(s) Oral at bedtime  sodium chloride 0.9% Bolus 250 milliLiter(s) IV Bolus once  sodium chloride 0.9%. 1000 milliLiter(s) (75 mL/Hr) IV Continuous <Continuous>      DVT Prophylaxis:  LMWH                   (x )  Heparin SQ           (  )  Coumadin             (  )  Xarelto                  (  )  Eliquis                   (  )  Venodynes           (x  )  Ambulation          ( x )  UFH                       (  )  Contraindicated  (  )

## 2018-01-07 NOTE — PROGRESS NOTE ADULT - SUBJECTIVE AND OBJECTIVE BOX
Patient is a 89 y/o/f w/ pmhx of HTN / HLD / Dementia / COPD sent to ED from  [facility] for evaluation of sinus tachycardia 110. Upon admission, patient was noted to have temp.  In ED patient was hypotensive  and tachycardic 110, BP stabilised s/p IVF 2 L bolus and broad spectrum IV abx. CXR showed possible  right sided PNA. WBC 17, elevated lactate 2.8 >>2.9    1/4: Chart reviewed, Pt was seen and examined. Uncomfortable in pain.  Baseline confused. No fevers   1/5: Pt was seen and examined, OOB to chair, comfortable, baseline confused, answering some Qs. Reports no pain.  RN reports min oral intake and low UO.    1/6: Pt was seen and examined, OOB to chair, more alert, baseline confused, looks comfortable. Denies pain.   1/7: OOb to chair, awake and alert, comfortable, reports no pain but does not want to be moved or touched. As per RN, still min oral intake       REVIEW OF SYSTEMS:  Unable to obtain due to baseline dementia     Physical Exam:     Vital Signs Last 24 Hrs  T(C): 36.7 (07 Jan 2018 16:44), Max: 36.8 (07 Jan 2018 11:27)  T(F): 98.1 (07 Jan 2018 16:44), Max: 98.3 (07 Jan 2018 11:27)  HR: 83 (07 Jan 2018 16:44) (73 - 83)  BP: 143/54 (07 Jan 2018 16:44) (130/64 - 156/80)  RR: 18 (07 Jan 2018 16:44) (16 - 18)  SpO2: 95% (07 Jan 2018 16:44) (92% - 95%)    GENERAL APPEARANCE:  elderly, frail, very deconditioned, NAD  HEENT:  Head is normocephalic. EOMI  Skin:  Warm and dry without any rash   NECK:  Supple  no JVD   HEART:  Regular rate and rhythm. Normal S1 and S2, No Murmurs   LUNGS: Decreased BS at bases b/l. No wheezing or rhonchi  ABDOMEN:  Soft, nontender, nondistended, + BS   EXTREMITIES: No pedal edema B/l  NEUROLOGICAL:  Grossly  non focal   MUSCULOSKELETAL: L thigh edema  and ecchymosis  better.  Dressing saturated       Labs:                               11.2   8.8   )-----------( 259      ( 07 Jan 2018 06:04 )             35.7     01-07    144  |  108  |  16  ----------------------------<  86  4.0   |  29  |  0.26<L>    Ca    8.0<L>      07 Jan 2018 06:04                 10.1   9.0   )-----------( 200      ( 05 Jan 2018 07:01 )             31.8     01-05    144  |  111<H>  |  21  ----------------------------<  100<H>  4.5   |  27  |  0.35<L>    Ca    7.9<L>      05 Jan 2018 07:01                              11.0   10.5  )-----------( 230      ( 04 Jan 2018 05:54 )             33.0     01-04    142  |  108  |  23  ----------------------------<  109<H>  4.9   |  28  |  0.50    Ca    8.5      04 Jan 2018 05:54

## 2018-01-07 NOTE — PROGRESS NOTE ADULT - PROBLEM SELECTOR PLAN 2
- due to poor oral intake   - BP stable   - UO still decreased despite IVF. Likely insufficient  Oral intake   - c/w  IVF hydration  - Monitor  UO  -Still min oral intake.   - Failure to thrive

## 2018-01-07 NOTE — PROGRESS NOTE ADULT - ATTENDING COMMENTS
POC d/w Palliative team and Dr Verona Shanks on 1/5. Pt  looks weak and unlikely will  be able to participate in PT at this point. Will continue hydration and adjust pain meds. Hopefully Pts PO intake improves. If not then could be candidate for Home Hospice at the facility. That was by Dr Garcia with [facility]    Also case d/w Dr Sanchez (PCP) knows Pt for long time. Pts Fx status declines significantly in past months, long before this fall.

## 2018-01-08 RX ORDER — ROBINUL 0.2 MG/ML
0.2 INJECTION INTRAMUSCULAR; INTRAVENOUS EVERY 6 HOURS
Qty: 0 | Refills: 0 | Status: DISCONTINUED | OUTPATIENT
Start: 2018-01-08 | End: 2018-01-09

## 2018-01-08 RX ORDER — HYDROMORPHONE HYDROCHLORIDE 2 MG/ML
0.2 INJECTION INTRAMUSCULAR; INTRAVENOUS; SUBCUTANEOUS
Qty: 0 | Refills: 0 | Status: DISCONTINUED | OUTPATIENT
Start: 2018-01-08 | End: 2018-01-09

## 2018-01-08 RX ADMIN — Medication 1 MILLIGRAM(S): at 11:48

## 2018-01-08 RX ADMIN — LATANOPROST 1 DROP(S): 0.05 SOLUTION/ DROPS OPHTHALMIC; TOPICAL at 20:40

## 2018-01-08 RX ADMIN — HYDROMORPHONE HYDROCHLORIDE 0.2 MILLIGRAM(S): 2 INJECTION INTRAMUSCULAR; INTRAVENOUS; SUBCUTANEOUS at 22:37

## 2018-01-08 RX ADMIN — Medication 1 TABLET(S): at 11:47

## 2018-01-08 RX ADMIN — PREGABALIN 1000 MICROGRAM(S): 225 CAPSULE ORAL at 11:47

## 2018-01-08 NOTE — PROGRESS NOTE ADULT - PROBLEM SELECTOR PLAN 8
- elevated troponins, no CP   - continue bblocker for PSVT and pos trop    - C/w  ACEI for nonSTEMI  - Tele": No SVT epsiodes since 1/3  - EKG changes noted  - cardiology follow up with Dr. Harrison
- elevated troponins, no CP   - continue bblocker for PSVT and pos trop    - C/w  ACEI for nonSTEMI  - Tele": No SVT epsiodes since 1/3  - EKG changes noted  - cardiology follow up with Dr. Harrison
- elevated troponins  - EKG changes noted  - cardiology follow up with Dr. Harrison
- elevated troponins  - continue bblocker for PSVT and pos trop and start ACE I for nonSTEMI  - EKG changes noted  - cardiology follow up with Dr. Harrison
- elevated troponins, no CP   - continue bblocker for PSVT and pos trop    - C/w  ACEI for nonSTEMI  - Tele": No SVT epsiodes since 1/3  - EKG changes noted  - cardiology follow up with Dr. Harrison

## 2018-01-08 NOTE — PROGRESS NOTE ADULT - PROBLEM SELECTOR PROBLEM 9
NATTY (acute kidney injury)

## 2018-01-08 NOTE — PROGRESS NOTE ADULT - PROBLEM SELECTOR PLAN 3
- supportive care
-supportive care
- supportive care
-supportive care

## 2018-01-08 NOTE — PROGRESS NOTE ADULT - PROBLEM SELECTOR PROBLEM 6
HLD (hyperlipidemia)

## 2018-01-08 NOTE — CHART NOTE - NSCHARTNOTEFT_GEN_A_CORE
Will sign off on pt.  cont on Lovenox 40mg sq daily for four weeks post procedure on 1-1-18.  Pt going to hospice care.  Please reconsult if needed.

## 2018-01-08 NOTE — PROGRESS NOTE ADULT - PROBLEM SELECTOR PLAN 9
with Azotemia on admission  - Cr likely at baseline now.   - Monitor closely

## 2018-01-08 NOTE — PROGRESS NOTE ADULT - PROBLEM SELECTOR PLAN 5
-respiratory status is stable  -nebs prn

## 2018-01-08 NOTE — CHART NOTE - NSCHARTNOTEFT_GEN_A_CORE
HPI:  89 Fhx HTN/HLD/Dementia/COPD presents to ED sent by [facility] for evaluation of sinus tachycardia 110 and to   r/o dehydration. ,. In Ed  on arrival per ED physician patient was found to be  thin and frail appearing complaining of no pain.had fever 100.7 in ED    pmhx/pshx- dementia/HTN/HLD/COPD/Pulmonary nodules,vitamin b12 deficiency, GERD, hx of pelvic , hx of appendectomy, tonsillectomy,vaginal repair   social- lives in [facility]  family hx- non contributory    . (30 Dec 2017 03:57)      PERTINENT PMH REVIEWED:  [ X ] YES [ ] NO           Primary Contact:  Tatyana Rene                  Relationship:    Niece                     HCP/Surrogate:   HCP    HCP [ X  ] Surrogate [   ] Guardian [   ]    Mental Status: [ ] Alert  [  ] Oriented [  ] Confused [  X ] Lethargic [  ]    Family Meeting attendees: Pts niece Tatyana and her daughter Peri (over the phone), Dr. Woodall, Damion Enriquez Palliative SW    Anticipated Grief: Patient [   ] Family [ X ]    Goals of Care: Comfort [ X ] Rehabilitation [  ] Curative [  ] Life Prolonging [  ]    Previous Services: [facility]    ADVANCE DIRECTIVES:  [ X ] YES [ ] NO   DNR [ X ] YES [ ] NO  DNI [ X ] YES [ ] NO                    Anticipated D/C Plan: [facility]                   Summary:    Team spoke with pts family over the phone to follow up and further discuss plan. Pts current medical condition and goals of care discussed. Previous family meeting had been held over the phone with Dr. Sacks-Berg. Pts family resides in [place] and could not get here due to the weather.    Team explained how pt. has not improved during this admission and is not taking PO. Pts family expressed understanding of this. Pts family agreed they do not want aggressive measures for pt. like resuscitation, intubation or artifical feeding. Team discussed option of focusing on pts comfort and inpatient hospice. Pts family agrees and would like referral sent to [facility] for inpatient hospice services.     Plan for referral for inpatient hospice. Emotional support provided. Our team will continue to follow.

## 2018-01-08 NOTE — PROGRESS NOTE ADULT - PROBLEM SELECTOR PROBLEM 4
Left hip subluxation

## 2018-01-08 NOTE — PROGRESS NOTE ADULT - PROVIDER SPECIALTY LIST ADULT
Anticoag Management
Cardiology
Cardiology
Hospitalist
Infectious Disease
Infectious Disease
Orthopedics
Palliative Care
Palliative Care
Anticoag Management
Hospitalist
Hospitalist

## 2018-01-08 NOTE — PROGRESS NOTE ADULT - SUBJECTIVE AND OBJECTIVE BOX
Patient is a 89 y/o/f w/ pmhx of HTN / HLD / Dementia / COPD sent to ED from  [facility] for evaluation of sinus tachycardia 110. Upon admission, patient was noted to have temp.  In ED patient was hypotensive  and tachycardic 110, BP stabilised s/p IVF 2 L bolus and broad spectrum IV abx. CXR showed possible  right sided PNA. WBC 17, elevated lactate 2.8 >>2.9    1/4: Chart reviewed, Pt was seen and examined. Uncomfortable in pain.  Baseline confused. No fevers   1/5: Pt was seen and examined, OOB to chair, comfortable, baseline confused, answering some Qs. Reports no pain.  RN reports min oral intake and low UO.    1/6: Pt was seen and examined, OOB to chair, more alert, baseline confused, looks comfortable. Denies pain.   1/7: OOb to chair, awake and alert, comfortable, reports no pain but does not want to be moved or touched. As per RN, still min oral intake   1/8 OOb to chair no changes, refused meds in am, min PO intake      REVIEW OF SYSTEMS:  Unable to obtain due to baseline dementia     Physical Exam:   Vital Signs Last 24 Hrs  T(C): 36.4 (08 Jan 2018 17:21), Max: 36.9 (08 Jan 2018 10:44)  T(F): 97.5 (08 Jan 2018 17:21), Max: 98.4 (08 Jan 2018 10:44)  HR: 84 (08 Jan 2018 17:21) (84 - 106)  BP: 151/82 (08 Jan 2018 17:21) (141/77 - 151/82)  BP(mean): --  RR: 16 (08 Jan 2018 17:21) (12 - 19)  SpO2: 93% (08 Jan 2018 17:21) (90% - 96%)        GENERAL APPEARANCE:  elderly, frail, very deconditioned, NAD  HEENT:  Head is normocephalic. EOMI  Skin:  Warm and dry without any rash   NECK:  Supple  no JVD   HEART:  Regular rate and rhythm. Normal S1 and S2, No Murmurs   LUNGS: Decreased BS at bases b/l. No wheezing or rhonchi  ABDOMEN:  Soft, nontender, nondistended, + BS   EXTREMITIES: No pedal edema B/l  NEUROLOGICAL:  Grossly  non focal   MUSCULOSKELETAL: L thigh edema  and ecchymosis  better.  Dressing intact       Labs:                               11.2   8.8   )-----------( 259      ( 07 Jan 2018 06:04 )             35.7     01-07    144  |  108  |  16  ----------------------------<  86  4.0   |  29  |  0.26<L>    Ca    8.0<L>      07 Jan 2018 06:04                 10.1   9.0   )-----------( 200      ( 05 Jan 2018 07:01 )             31.8     01-05    144  |  111<H>  |  21  ----------------------------<  100<H>  4.5   |  27  |  0.35<L>    Ca    7.9<L>      05 Jan 2018 07:01                              11.0   10.5  )-----------( 230      ( 04 Jan 2018 05:54 )             33.0     01-04    142  |  108  |  23  ----------------------------<  109<H>  4.9   |  28  |  0.50    Ca    8.5      04 Jan 2018 05:54

## 2018-01-08 NOTE — PROGRESS NOTE ADULT - PROBLEM SELECTOR PROBLEM 2
Dehydration

## 2018-01-08 NOTE — PROGRESS NOTE ADULT - ATTENDING COMMENTS
POC d/w Palliative team, Pts family was contacted and pts condition and prognosis discussed. They agreeable on InPt hospice, will send referral

## 2018-01-08 NOTE — PROGRESS NOTE ADULT - PROBLEM SELECTOR PROBLEM 8
NSTEMI, initial episode of care

## 2018-01-08 NOTE — PROGRESS NOTE ADULT - PROBLEM SELECTOR PLAN 7
-suspect acute blood loss secondary to hip fx with bleeding into the thigh  - stool occult blood neg   - H/H stable   - S/p 2 units PRBC postop
-suspect acute blood loss secondary to hip fx with bleeding into the thigh  - stool occult blood neg   - H/H stable   - S/p 2 units PRBC postop
-suspect acute blood loss secondary to hip fx with bleeding into the thigh  -r/o gi bleed with stool occult blood  -t&s  -2 units PRBC
-suspect acute blood loss secondary to hip fx with bleeding into the thigh  -r/o gi bleed with stool occult blood  -t&s  -2 units PRBC
-suspect acute blood loss secondary to hip fx with bleeding into the thigh  - stool occult blood neg   - H/H stable   - S/p 2 units PRBC postop
-suspect acute blood loss secondary to hip fx with bleeding into the thigh  -r/o gi bleed with stool occult blood  -t&s  -2 units PRBC

## 2018-01-08 NOTE — GOALS OF CARE CONVERSATION - PERSONAL ADVANCE DIRECTIVE - NS PRO AD PATIENT TYPE ON CHART
Do Not Resuscitate (DNR)/Living Will/Health Care Proxy (HCP)
Do Not Intubate/Health Care Proxy (HCP)/Living Will/Do Not Resuscitate (DNR)

## 2018-01-08 NOTE — PROGRESS NOTE ADULT - ASSESSMENT
This is a 89 year ld female with fx of her left hip now s/p left hip IMN  on 1-1-18.  Pt has high thrombosis risk and requires anticoagulation prophylaxis.    Plan:  :Lovenox 40mg sq daily for four weeks post procedure  :daily cbc/bmp  :LE Venodynes  : increase mobility as tolerated   will f/u
89 y/o/f w/ pmhx of HTN / HLD / Dementia / COPD admitted for tachycardia and fever
89 yo woman with advanced dementia and severe protein-calorie malnutrition, admitted from assisted living facility on  - w/u= anemia, +MI, +fracture left femur. Underwent ORIF 2018. Has had 1 dose Dilaudid IVP in past 24 hrs and she is lethargic. Has had very little to eat or drink, has been unable to participate in PT.      1)Fracture left hip  -No H/O fall   -Underwent ORIF   -Appears lethargic   -Ask  PT to re-evaluate when pt more awake   -Analgesia with Tylenol. Would D/C  Oxycodone and  Dilaudid   -Will need ROLAND after discharge - will not be ready to return to assisted living at this time     2)Malnutrition  -BMI<9  -Appreciate dietician evaluation and recommendations  -Severe protein-calorie malnutrition  -At this time, pt is taking very little food or fluid by mouth  -Needs assistance with meals  -Unfortunately, anorexia is a common problem in advanced dementia and this may be the cause of this malnutrition (though family states she has always been thin)    3)+MI  -+troponins, EKG changes    -Appreciate cardiology consult  -VSS    4) Anemia  -transfused 2 u PRBC's  -likely secondary to bleeding ~ fracture  -H/H stable now    5)Fever on admission  -Now normothermic  -No evidence of infection  -No antibiotics at this time   -Appreciate ID consult    6)Advance Directives  -Has HCP form on chart  -HCP #1=Eliseo Israel - he is  x 2 yrs  -HCP #2= Tatyana Sofiaaacson = niece- she lives in [place] - her phone= &[phone]  -Pt has out of hosp DNR signed by Dr Sanchez    7) Discharge plans  -Will need ROLAND (if she can participate) before returning to assisted living facility  -If she does not eat or drink, she would be a candidate for hospice - will see how she does over the next few days   -I spoke with Sania, the daughter of Tatyana López ( HCP) by phone - she will be available by phone to discuss "next steps" .    -In summary, family hoping for ROLAND then move pt to [place] where she can be closer to them  -If pt able to tolerate PT and ROLAND reasonable option will attempt to complete MOLST prior to discharge
89 yo woman with advanced dementia and severe protein-calorie malnutrition, admitted from assisted living facility on  - w/u= anemia, +MI, +fracture left femur. Underwent ORIF last PM and appears comfortable and with stable VS.     1)Fracture left hip  -No H/O fall   -Underwent ORIF   -Appears comfortable   -Cont PT as tolerated  -Analgesia with Tylenol Oxycodone, Dilaudid PRN  -Will need ROLAND after discharge - will not be ready to return to assisted living at this time     2)Malnutrition  -BMI<9  -Appreciate dietician evaluation and recommendations  -Severe protein-calorie malnutrition  -Needs assistance with meals-Unfortunately, anorexia is a common problem in advanced dementia and this may be the cause of this malnutrition (though family states she has always been thin)    3)+MI  -+troponins, EKG changes    -Appreciate cardiology consult  -No chest pain    4) Anemia  -transfused 2 u PRBC's  -likely secondary to bleeding ~ fracture  -H/H stable now    5)Fever on admission  -Now normothermic  -No evidence of infection  -No antibiotics at this time   -Appreciate ID consult    6)Advance Directives  -Has HCP form on chart  -HCP #1=Eliseo Israel - he is  x 2 yrs  -HCP #2= tatyananancy Reinososon = niece- she lives in [place] - her phone= &[phone]  -Pt has out of hosp DNR signed by Dr Sanchez    7) Discharge plans  -Will need ROLAND (if she can participate) before returning to assisted living facility  -Team spoke with Tatyana López (and her daughter) by phone - she will be available by phone to discuss "next steps" .  see goals of care note dated 18.  -In summary niece (HCP Tatyana) hoping for ROLAND then move pt to [place] where she can be closer to her  -If pt able to tolerate PT and ROLAND reasonable option will attempt to complete MOLST prior to discharge
89 Fhx HTN/HLD/Dementia/COPD presents to ED sent by [facility] for evaluation of sinus tachycardia 110 and to   r/o dehydration.  She was found to ryne anemic , have new EKG changes and pos trop , PAtient is confused but denies CP .   1) appears sable post op  2) continue bblocker for PSVT and pos trop  3) murmur on exam sound like only moderate AS given preserved S2 conservative maangement  if remains stable can DC tele   4) DVT prohylaxis
89 female with past medical history  HTN/HLD/Dementia/COPD admitted on 12/29 for evaluation of rapid heart rate while at assisted living; upon admission found to be hypotensive, with elevated lactate and wbc; history per medical record as patient unable to provide history. She is saying she is cold. Was also febrile upon admission.  1. Patient admitted with possible early sepsis of unclear etiology  - panculture in progress  - iv hydration and supportive care   -  serial cbc and monitor temperature   - day #2 zosyn  - no obvious source of infection; most likely fever due to bone marrow disturbance of hip fracture  - agree with stopping antibiotics  2. other issues; HTN/HLD/Dementia/COPD   - per medicine  If further ID issues please reconsult
89 female with past medical history  HTN/HLD/Dementia/COPD admitted on 12/29 for evaluation of rapid heart rate while at assisted living; upon admission found to be hypotensive, with elevated lactate and wbc; history per medical record as patient unable to provide history. She is saying she is cold. Was also febrile upon admission.  1. Patient admitted with possible early sepsis of unclear etiology  - panculture in progress  - iv hydration and supportive care   -  serial cbc and monitor temperature   - day #2 zosyn  - tolerating antibiotics without rashes or side effects   - will provide short course of antibiotics should cultures be unrevealing  2. other issues; HTN/HLD/Dementia/COPD   - per medicine
89F s/p IMN L Hip fracture POD 0  Analgesia  DVT ppx  Incentive spirometry  WBAT  P/T  Discharge planning
89F s/p IMN Left IT fx POD 1  Analgesia  DVT ppx  Incentive spirometry  WBAT  P/T  Discharge planning
89F with dehydration, anemia, left hip fx      ***Medical Clearance:  -pt is at moderate risk for perioperative cardiac complications with an intermediate risk procedure due to advanced age. Pt is medically optimized for repair of left hip fx if stool occult blood is negative and EKG is normal, along with the following recommendations below:        *DVT Px:  -dc heparin secondary to anemia  -IPCs
A/P:  89yFemale w/ L IT fx  Pain control  nwb LLE  DVT ppx SCDs  fu official reads XR  transfuse 2U PRBC  Medical management appreciated  plan for OR today after medical optimization/clearance  npo except meds  iv fluids while npo  attending aware
This is a 89 y/o female with left hip fx now s/p left hip IMN  on 1-1-18.  Pt has high thrombosis risk and requires anticoagulation prophylaxis.    Plan:  : c/w lovenox 40mg sq daily for four weeks post procedure  : daily cbc/bmp  : maintain LE venodynes  : increase mobility as tolerated      Will continue to follow. Please call [phone] for this weekend.
This is a 89 y/o female with left hip fx now s/p left hip IMN  on 1/1/18.  Pt has high thrombosis risk and requires anticoagulation prophylaxis. As per primary team's note, pt may return to her assisted living for home hospice since unable for PT.    Plan:  : c/w lovenox 40mg sq daily for 4 weeks post procedure  : daily cbc/bmp  : maintain LE venodynes  : increase mobility as tolerated      Will continue to follow. Please call [phone] for this weekend.
This is a 89 year ld female with fx of her left hip now s/p left hip IMN  on 1-1-18.  Pt has high thrombosis risk and requires anticoagulation prophylaxis.    Plan:  :Lovenox 40mg sq daily for four weeks post procedure  :daily cbc/bmp  :LE Venodynes  : increase mobility as tolerated   will f/u
patient has R hip thigh hematoma with hip externally rotated  no hx fall  will get stat xray and ortho evaluation  cont abx  ivf  Palliative care consult  labs in am
89 Fhx HTN/HLD/Dementia/COPD presents to ED sent by [facility] for evaluation of sinus tachycardia 110 and to   r/o dehydration.  She was found to ryne anemic , have new EKG changes and pos trop , PAtient is confused but denies CP .   1) appears sable post op  2) continue bblocker for PSVT and pos trop and start ACE I for pos trop and nonSTEMI  3) murmur on exam sound like only moderate AS given preserved S2 conservative maangement  if remains stable can DC tele   4) DVT prohylaxis
89 y/o/f w/ pmhx of HTN / HLD / Dementia / COPD admitted for tachycardia and fever
89 y/o/f w/ pmhx of HTN / HLD / Dementia / COPD admitted for tachycardia and fever

## 2018-01-08 NOTE — GOALS OF CARE CONVERSATION - PERSONAL ADVANCE DIRECTIVE - CONVERSATION DETAILS
HCP #1 Eliseo Israel is  - he  2 yrs ago. I called niece Tatyana (HCP#2)  on phone - Tatyana's daughter Sania was also on the line. I reviewed the recent events and currents status (POD#1 ORIF L hip and +MI). Family is upset that they didn't know she was admitted x 2 days and they do not understand how the hip was broken.  They live in [place] and are unable to visit. Family is processing some anger over above issues.   They are hopeful that pt can undergo rehab in [facility] (they prefer [facility])  and ?go to an assisted living facility near them afterwards? Given her advanced dementia, +MI and advanced age, there is a chance that pt will not be able to participate in rehab but will ask PT to evaluate.
Called pts amber via phone Tatyana (HCP) and her daughter Sania to update them on pt condition.  Pt has previously spoken to Dr. Sacks-Berg from our team with regard to goals and options.      I reviewed pts current status, that unfortunately she continues to take in minimal to no po intake as far as fluids and food.  She is also refusing most of her Po meds.  I explained she is very withdrawn and even with encouragement has minimally been able to work with PT.  I explained ROLAND is not going to be an option for her.    We reviewed the signs and decline we are seeing are indicative that pt is approaching end of life and things such as a feeding tube or appetite stimulant will not alter the outcome.  we discussed the option of inpt hospice care.    Pt is eligible for inpt hospice with dx of end stage dementia and requirement for IV pain meds for left hip s/p fx as she is not taking or spitting out PO meds.  We also discussed D/cing IVFs as to avoid fluid overload and transitioning to strict comfort care.    [facility] asked if they could try to say hello to pt on phone so I went to her room and called them from her phone.  Pt was unable to really talk them due to lethargy but they were appreciative of the effort. After that call they were certain they wished to proceed with referral to inpt hospice and requested VNS.    Re-affirmed preference for DNR and DNI order.    Emotional support provided      D/w Dr. Abbasi      Spent 50 minutes total discussing advance directives, advance care planning and therapeutic alternatives

## 2018-01-08 NOTE — PROGRESS NOTE ADULT - PROBLEM SELECTOR PROBLEM 5
COPD (chronic obstructive pulmonary disease)

## 2018-01-09 VITALS
RESPIRATION RATE: 16 BRPM | HEART RATE: 96 BPM | DIASTOLIC BLOOD PRESSURE: 78 MMHG | TEMPERATURE: 98 F | OXYGEN SATURATION: 94 % | SYSTOLIC BLOOD PRESSURE: 138 MMHG

## 2018-01-09 RX ORDER — AMLODIPINE BESYLATE 2.5 MG/1
1 TABLET ORAL
Qty: 0 | Refills: 0 | COMMUNITY

## 2018-01-09 RX ORDER — CHOLECALCIFEROL (VITAMIN D3) 125 MCG
1 CAPSULE ORAL
Qty: 0 | Refills: 0 | COMMUNITY

## 2018-01-09 RX ORDER — HYDROMORPHONE HYDROCHLORIDE 2 MG/ML
0.2 INJECTION INTRAMUSCULAR; INTRAVENOUS; SUBCUTANEOUS
Qty: 0 | Refills: 0 | COMMUNITY
Start: 2018-01-09

## 2018-01-09 RX ORDER — METOPROLOL TARTRATE 50 MG
1 TABLET ORAL
Qty: 0 | Refills: 0 | COMMUNITY

## 2018-01-09 RX ORDER — ROBINUL 0.2 MG/ML
0.2 INJECTION INTRAMUSCULAR; INTRAVENOUS
Qty: 0 | Refills: 0 | COMMUNITY
Start: 2018-01-09

## 2018-01-09 RX ORDER — ASPIRIN/CALCIUM CARB/MAGNESIUM 324 MG
1 TABLET ORAL
Qty: 0 | Refills: 0 | COMMUNITY

## 2018-01-09 RX ORDER — GABAPENTIN 400 MG/1
0 CAPSULE ORAL
Qty: 0 | Refills: 0 | COMMUNITY

## 2018-01-09 RX ORDER — QUETIAPINE FUMARATE 200 MG/1
0 TABLET, FILM COATED ORAL
Qty: 0 | Refills: 0 | COMMUNITY

## 2018-01-09 RX ORDER — POTASSIUM CHLORIDE 20 MEQ
1 PACKET (EA) ORAL
Qty: 0 | Refills: 0 | COMMUNITY

## 2018-01-09 RX ORDER — FAMOTIDINE 10 MG/ML
1 INJECTION INTRAVENOUS
Qty: 0 | Refills: 0 | COMMUNITY

## 2018-01-09 RX ORDER — PREGABALIN 225 MG/1
1 CAPSULE ORAL
Qty: 0 | Refills: 0 | COMMUNITY

## 2018-01-09 RX ORDER — SIMVASTATIN 20 MG/1
1 TABLET, FILM COATED ORAL
Qty: 0 | Refills: 0 | COMMUNITY

## 2018-01-09 RX ORDER — ONDANSETRON 8 MG/1
4 TABLET, FILM COATED ORAL
Qty: 0 | Refills: 0 | COMMUNITY
Start: 2018-01-09

## 2018-01-09 RX ADMIN — HYDROMORPHONE HYDROCHLORIDE 0.2 MILLIGRAM(S): 2 INJECTION INTRAMUSCULAR; INTRAVENOUS; SUBCUTANEOUS at 11:19

## 2018-01-15 DIAGNOSIS — R62.7 ADULT FAILURE TO THRIVE: ICD-10-CM

## 2018-01-15 DIAGNOSIS — R00.0 TACHYCARDIA, UNSPECIFIED: ICD-10-CM

## 2018-01-15 DIAGNOSIS — I24.8 OTHER FORMS OF ACUTE ISCHEMIC HEART DISEASE: ICD-10-CM

## 2018-01-15 DIAGNOSIS — N17.9 ACUTE KIDNEY FAILURE, UNSPECIFIED: ICD-10-CM

## 2018-01-15 DIAGNOSIS — I35.0 NONRHEUMATIC AORTIC (VALVE) STENOSIS: ICD-10-CM

## 2018-01-15 DIAGNOSIS — M84.459A PATHOLOGICAL FRACTURE, HIP, UNSPECIFIED, INITIAL ENCOUNTER FOR FRACTURE: ICD-10-CM

## 2018-01-15 DIAGNOSIS — E86.0 DEHYDRATION: ICD-10-CM

## 2018-01-15 DIAGNOSIS — E78.5 HYPERLIPIDEMIA, UNSPECIFIED: ICD-10-CM

## 2018-01-15 DIAGNOSIS — I95.9 HYPOTENSION, UNSPECIFIED: ICD-10-CM

## 2018-01-15 DIAGNOSIS — R91.8 OTHER NONSPECIFIC ABNORMAL FINDING OF LUNG FIELD: ICD-10-CM

## 2018-01-15 DIAGNOSIS — N13.30 UNSPECIFIED HYDRONEPHROSIS: ICD-10-CM

## 2018-01-15 DIAGNOSIS — I10 ESSENTIAL (PRIMARY) HYPERTENSION: ICD-10-CM

## 2018-01-15 DIAGNOSIS — J44.9 CHRONIC OBSTRUCTIVE PULMONARY DISEASE, UNSPECIFIED: ICD-10-CM

## 2018-01-15 DIAGNOSIS — N28.1 CYST OF KIDNEY, ACQUIRED: ICD-10-CM

## 2018-01-15 DIAGNOSIS — E43 UNSPECIFIED SEVERE PROTEIN-CALORIE MALNUTRITION: ICD-10-CM

## 2018-01-15 DIAGNOSIS — I47.1 SUPRAVENTRICULAR TACHYCARDIA: ICD-10-CM

## 2018-01-15 DIAGNOSIS — Z79.82 LONG TERM (CURRENT) USE OF ASPIRIN: ICD-10-CM

## 2018-01-15 DIAGNOSIS — B34.9 VIRAL INFECTION, UNSPECIFIED: ICD-10-CM

## 2018-01-15 DIAGNOSIS — Z51.5 ENCOUNTER FOR PALLIATIVE CARE: ICD-10-CM

## 2018-01-15 DIAGNOSIS — K21.9 GASTRO-ESOPHAGEAL REFLUX DISEASE WITHOUT ESOPHAGITIS: ICD-10-CM

## 2018-01-15 DIAGNOSIS — Z66 DO NOT RESUSCITATE: ICD-10-CM

## 2018-01-15 DIAGNOSIS — D62 ACUTE POSTHEMORRHAGIC ANEMIA: ICD-10-CM

## 2018-01-15 DIAGNOSIS — E53.8 DEFICIENCY OF OTHER SPECIFIED B GROUP VITAMINS: ICD-10-CM

## 2018-01-15 DIAGNOSIS — I21.4 NON-ST ELEVATION (NSTEMI) MYOCARDIAL INFARCTION: ICD-10-CM

## 2019-09-07 NOTE — PATIENT PROFILE ADULT. - FUNCTIONAL SCREEN CURRENT LEVEL: COMMUNICATION, MLM
(3) unable to understand (not related to language barrier) 11.2   8.16  )-----------( 242      ( 07 Sep 2019 17:10 )             32.7     09-07    145  |  104  |  20  ----------------------------<  102<H>  4.7   |  18  |  2.0<H>    Ca    9.6      07 Sep 2019 17:10    EtOH 271    Urinalysis Basic - ( 07 Sep 2019 20:00 )    Color: Yellow / Appearance: Clear / S.025 / pH: x  Gluc: x / Ketone: Trace  / Bili: Negative / Urobili: 0.2 mg/dL   Blood: x / Protein: Trace mg/dL / Nitrite: Negative   Leuk Esterase: Negative / RBC: 1-2 /HPF / WBC x   Sq Epi: x / Non Sq Epi: Occasional /HPF / Bacteria: Few    EKG with sinus tachy

## 2020-11-05 NOTE — DIETITIAN INITIAL EVALUATION ADULT. - FACTORS AFF FOOD INTAKE
persistent lack of appetite/change in mental status/difficulty chewing/difficulty feeding self/difficulty swallowing Performing Laboratory: [id number] Billing Type: Third-Party Bill Bill For Surgical Tray: no Expected Date Of Service: 11/05/2020

## 2022-01-03 NOTE — PROVIDER CONTACT NOTE (OTHER) - REASON
Again, patient is on too much pain medication. Cannot prescribe any more. If having breakthrough pain, would need referral to pain management. urology

## 2022-11-07 NOTE — PROGRESS NOTE ADULT - SUBJECTIVE AND OBJECTIVE BOX
Patient is a 89y old  Female who presents with a chief complaint of [facility] from [facility] for tachycardia with suspicion for dehydration (30 Dec 2017 03:57)      1/2/2018 POD #1 appears comfortable , denies CP or SOB tele showed just a short burst of SVT   MEDICATIONS  (STANDING):  ascorbic acid 500 milliGRAM(s) Oral two times a day  ceFAZolin   IVPB 2000 milliGRAM(s) IV Intermittent every 8 hours  cyanocobalamin 1000 MICROGram(s) Oral daily  docusate sodium 100 milliGRAM(s) Oral three times a day  enoxaparin Injectable 40 milliGRAM(s) SubCutaneous every 24 hours  folic acid 1 milliGRAM(s) Oral daily  gabapentin 100 milliGRAM(s) Oral two times a day  lactated ringers. 1000 milliLiter(s) (50 mL/Hr) IV Continuous <Continuous>  latanoprost 0.005% Ophthalmic Solution 1 Drop(s) Both EYES at bedtime  multivitamin 1 Tablet(s) Oral daily  pantoprazole  Injectable 20 milliGRAM(s) IV Push daily  QUEtiapine 25 milliGRAM(s) Oral at bedtime  simvastatin 20 milliGRAM(s) Oral at bedtime    MEDICATIONS  (PRN):  acetaminophen   Tablet 650 milliGRAM(s) Oral every 6 hours PRN For Temp greater than 38 C (100.4 F)  benzocaine 15 mG/menthol 3.6 mG Lozenge 1 Lozenge Oral every 3 hours PRN Sore Throat  diphenhydrAMINE   Capsule 25 milliGRAM(s) Oral at bedtime PRN Insomnia  hydrALAZINE Injectable 5 milliGRAM(s) IV Push every 5 minutes PRN sbp>160  HYDROmorphone  Injectable 0.5 milliGRAM(s) IV Push every 4 hours PRN Severe Pain (7 - 10)  ondansetron Injectable 4 milliGRAM(s) IV Push every 6 hours PRN Nausea and/or Vomiting  oxyCODONE    IR 10 milliGRAM(s) Oral every 4 hours PRN Moderate Pain (4 - 6)            Vital Signs Last 24 Hrs  T(C): 36.4 (02 Jan 2018 05:15), Max: 38.1 (01 Jan 2018 09:15)  T(F): 97.5 (02 Jan 2018 05:15), Max: 100.6 (01 Jan 2018 11:10)  HR: 89 (02 Jan 2018 05:15) (67 - 89)  BP: 136/73 (02 Jan 2018 05:15) (127/74 - 185/105)  BP(mean): --  RR: 18 (02 Jan 2018 05:15) (10 - 18)  SpO2: 96% (02 Jan 2018 05:15) (89% - 99%)            INTERPRETATION OF TELEMETRY:    ECG:        LABS:                        11.0   10.7  )-----------( 200      ( 02 Jan 2018 06:42 )             32.8     01-02    140  |  105  |  22  ----------------------------<  63<L>  3.4<L>   |  22  |  0.43<L>    Ca    7.8<L>      02 Jan 2018 06:42      CARDIAC MARKERS ( 01 Jan 2018 05:38 )  2.770 ng/mL / x     / x     / x     / x          PT/INR - ( 01 Jan 2018 05:38 )   PT: 9.5 sec;   INR: 0.88 ratio         PTT - ( 01 Jan 2018 05:38 )  PTT:25.7 sec    I&O's Summary    01 Jan 2018 07:01  -  02 Jan 2018 07:00  --------------------------------------------------------  IN: 1874 mL / OUT: 1225 mL / NET: 649 mL      BNP  RADIOLOGY & ADDITIONAL STUDIES: Awake/Alert/Cooperative
